# Patient Record
Sex: FEMALE | Race: BLACK OR AFRICAN AMERICAN | Employment: UNEMPLOYED | URBAN - METROPOLITAN AREA
[De-identification: names, ages, dates, MRNs, and addresses within clinical notes are randomized per-mention and may not be internally consistent; named-entity substitution may affect disease eponyms.]

---

## 2018-08-01 PROBLEM — O09.91 HIGH-RISK PREGNANCY, FIRST TRIMESTER: Status: ACTIVE | Noted: 2018-08-01

## 2018-08-01 PROBLEM — Z87.51 HISTORY OF PRETERM DELIVERY: Status: ACTIVE | Noted: 2018-08-01

## 2018-08-01 PROBLEM — Z98.891 HISTORY OF CESAREAN SECTION: Status: ACTIVE | Noted: 2018-08-01

## 2018-08-01 PROBLEM — D57.3 SICKLE CELL TRAIT (HCC): Status: ACTIVE | Noted: 2018-08-01

## 2018-08-30 PROBLEM — D57.1: Status: ACTIVE | Noted: 2018-08-01

## 2018-08-30 PROBLEM — O99.211 OBESITY AFFECTING PREGNANCY IN FIRST TRIMESTER: Status: ACTIVE | Noted: 2018-08-30

## 2018-08-30 PROBLEM — Z36.82 NUCHAL TRANSLUCENCY OF FETUS ON PRENATAL ULTRASOUND: Status: ACTIVE | Noted: 2018-08-30

## 2018-08-30 PROBLEM — O34.219 HISTORY OF CESAREAN SECTION COMPLICATING PREGNANCY: Status: ACTIVE | Noted: 2018-08-01

## 2018-08-30 PROBLEM — O99.011: Status: ACTIVE | Noted: 2018-08-01

## 2018-08-30 PROBLEM — O09.891 HISTORY OF PRETERM DELIVERY, CURRENTLY PREGNANT IN FIRST TRIMESTER: Status: ACTIVE | Noted: 2018-08-01

## 2018-10-17 PROBLEM — O09.892 HISTORY OF PRETERM DELIVERY, CURRENTLY PREGNANT, SECOND TRIMESTER: Status: ACTIVE | Noted: 2018-08-01

## 2018-10-17 PROBLEM — O99.012: Status: ACTIVE | Noted: 2018-08-01

## 2018-10-17 PROBLEM — O09.92 HIGH-RISK PREGNANCY IN SECOND TRIMESTER: Status: ACTIVE | Noted: 2018-08-01

## 2018-10-17 PROBLEM — O99.212 OBESITY AFFECTING PREGNANCY IN SECOND TRIMESTER: Status: ACTIVE | Noted: 2018-08-30

## 2019-01-19 ENCOUNTER — HOSPITAL ENCOUNTER (OUTPATIENT)
Age: 24
Discharge: HOME | End: 2019-01-20
Attending: OBSTETRICS & GYNECOLOGY | Admitting: OBSTETRICS & GYNECOLOGY
Payer: MEDICAID

## 2019-01-19 VITALS
SYSTOLIC BLOOD PRESSURE: 130 MMHG | TEMPERATURE: 98.1 F | DIASTOLIC BLOOD PRESSURE: 66 MMHG | RESPIRATION RATE: 20 BRPM | HEART RATE: 72 BPM

## 2019-01-20 PROBLEM — B34.9 VIRAL ILLNESS: Status: ACTIVE | Noted: 2019-01-20

## 2019-01-20 LAB
FLUAV AG NPH QL IA: NEGATIVE
FLUBV AG NPH QL IA: NEGATIVE
GLUCOSE, GLUUPC: NEGATIVE
KETONES UR-MCNC: NEGATIVE MG/DL
PROT UR QL: NEGATIVE
SPECIMEN SOURCE: NORMAL

## 2019-01-20 PROCEDURE — 96360 HYDRATION IV INFUSION INIT: CPT

## 2019-01-20 PROCEDURE — 99285 EMERGENCY DEPT VISIT HI MDM: CPT | Performed by: OBSTETRICS & GYNECOLOGY

## 2019-01-20 PROCEDURE — 74011000250 HC RX REV CODE- 250: Performed by: OBSTETRICS & GYNECOLOGY

## 2019-01-20 PROCEDURE — 81002 URINALYSIS NONAUTO W/O SCOPE: CPT | Performed by: OBSTETRICS & GYNECOLOGY

## 2019-01-20 PROCEDURE — 59025 FETAL NON-STRESS TEST: CPT | Performed by: OBSTETRICS & GYNECOLOGY

## 2019-01-20 PROCEDURE — 96374 THER/PROPH/DIAG INJ IV PUSH: CPT

## 2019-01-20 PROCEDURE — 87804 INFLUENZA ASSAY W/OPTIC: CPT

## 2019-01-20 PROCEDURE — 59025 FETAL NON-STRESS TEST: CPT

## 2019-01-20 PROCEDURE — 74011250636 HC RX REV CODE- 250/636: Performed by: OBSTETRICS & GYNECOLOGY

## 2019-01-20 PROCEDURE — 96372 THER/PROPH/DIAG INJ SC/IM: CPT | Performed by: OBSTETRICS & GYNECOLOGY

## 2019-01-20 PROCEDURE — 99285 EMERGENCY DEPT VISIT HI MDM: CPT

## 2019-01-20 PROCEDURE — 96374 THER/PROPH/DIAG INJ IV PUSH: CPT | Performed by: OBSTETRICS & GYNECOLOGY

## 2019-01-20 RX ORDER — SODIUM CHLORIDE, SODIUM LACTATE, POTASSIUM CHLORIDE, CALCIUM CHLORIDE 600; 310; 30; 20 MG/100ML; MG/100ML; MG/100ML; MG/100ML
1000 INJECTION, SOLUTION INTRAVENOUS CONTINUOUS
Status: DISCONTINUED | OUTPATIENT
Start: 2019-01-20 | End: 2019-01-20 | Stop reason: HOSPADM

## 2019-01-20 RX ADMIN — SODIUM CHLORIDE, SODIUM LACTATE, POTASSIUM CHLORIDE, AND CALCIUM CHLORIDE 1000 ML/HR: 600; 310; 30; 20 INJECTION, SOLUTION INTRAVENOUS at 01:08

## 2019-01-20 RX ADMIN — PROMETHAZINE HYDROCHLORIDE 12.5 MG: 25 INJECTION INTRAMUSCULAR; INTRAVENOUS at 01:12

## 2019-01-20 NOTE — ED PROVIDER NOTES
Chief Complaint: Nausea, vomiting, diarrhea 
 
 
20 y.o. female  at 32w6d 
weeks gestation who is seen for 1 week h/o nausea, vomiting, diarrhea, muscle aches, and cramping lower abdominal pain. Pt notes good FM. She denies VB, LOF, UTI or PEC symptoms. Pt denies cough, SOB or CP. HISTORY: 
 
Social History Substance and Sexual Activity Sexual Activity Yes  Partners: Male Patient's last menstrual period was 2018. Social History Socioeconomic History  Marital status:  Spouse name: Not on file  Number of children: Not on file  Years of education: Not on file  Highest education level: Not on file Social Needs  Financial resource strain: Not on file  Food insecurity - worry: Not on file  Food insecurity - inability: Not on file  Transportation needs - medical: Not on file  Transportation needs - non-medical: Not on file Occupational History  Not on file Tobacco Use  Smoking status: Never Smoker  Smokeless tobacco: Never Used Substance and Sexual Activity  Alcohol use: No  
 Drug use: No  
 Sexual activity: Yes  
  Partners: Male Other Topics Concern  Not on file Social History Narrative  Not on file Past Surgical History:  
Procedure Laterality Date   DELIVERY ONLY    
 HX CHOLECYSTECTOMY  HX TONSILLECTOMY Past Medical History:  
Diagnosis Date  
 Hx: UTI (urinary tract infection) 2018  Maternal sickle cell anemia complicating pregnancy, first trimester ([facility] [address]   delivery  Sickle cell disease ([facility] [address] Pt carrier of SC trait ROS: 
An 8 point review of symptoms negative except for chief complaint as described above. PHYSICAL EXAM: 
Blood pressure 130/66, pulse 72, temperature 98.1 °F (36.7 °C), resp. rate 20, last menstrual period 2018. Constitutional: The patient appears well, alert, oriented x 3. Cardiovascular: Heart RRR, no murmurs. Respiratory: Lungs clear, no respiratory distress GI: Abdomen soft, nontender, no guarding No fundal tenderness Lower ext: no edema, neg yumiko's, reflexes +2 Psychiatric:Mood/ Affect: appropriate Genitourinary: SVE: long and closed FHT: Category 1 with mod variability and + accels TOCO: rare ctx Abd ultrasound: vtx/ active fetus/FLASH is 10.5cm Vaginal ultrasound: CL - 3.75cm without funneling Flu swab obtained I personally reviewed pt's medical record including relevant labs and ultrasounds Assessment/Plan: 
Probable viral illness. No evidence of PTL. Administer IV fluids and IV phenergan. Will discharge to home after above administered. Pt to f/u with her PObP.

## 2019-01-20 NOTE — PROGRESS NOTES
Presents with multiple complaints nausea, vomiting diarrhea not feeling good, decreased fetal movement but states that she took a benadryl around 2100.

## 2019-01-20 NOTE — DISCHARGE INSTRUCTIONS
Patient Education         Labor: Care Instructions  Your Care Instructions     labor is the start of labor between 21 and 36 weeks of pregnancy. A full-term pregnancy lasts 37 to 42 weeks. In labor, the uterus contracts to open the cervix. This is the first stage of childbirth.  labor can be caused by a problem with the baby, the mother, or both. Often the cause is not known. In some cases, doctors use medicines to try to delay labor until 29 or more weeks of pregnancy. By this time, a baby has grown enough so that problems are not likely. In some cases--such as with a serious infection--it is healthier for the baby to be born early. Your treatment will depend on how far along you are in your pregnancy and on your health and your baby's health. Follow-up care is a key part of your treatment and safety. Be sure to make and go to all appointments, and call your doctor if you are having problems. It's also a good idea to know your test results and keep a list of the medicines you take. How can you care for yourself at home? · If your doctor prescribed medicines, take them exactly as directed. Call your doctor if you think you are having a problem with your medicine. · Rest until your doctor advises you about activity. He or she will tell you if you should stay in bed most of the time. You may need to arrange for  if you have young children. · Do not have sexual intercourse unless your doctor says it is safe. · Use pads, not tampons, if you have vaginal bleeding. · Make sure to drink plenty of fluids. Dehydration can lead to contractions. If you have kidney, heart, or liver disease and have to limit fluids, talk with your doctor before you increase the amount of fluids you drink. · Do not smoke or allow others to smoke around you. If you need help quitting, talk to your doctor about stop-smoking programs and medicines. These can increase your chances of quitting for good.   When should you call for help? Call 911 anytime you think you may need emergency care. For example, call if:    · You passed out (lost consciousness).     · You have severe vaginal bleeding.     · You have severe pain in your belly or pelvis.     · You have had fluid gushing or leaking from your vagina and you know or think the umbilical cord is bulging into your vagina. If this happens, immediately get down on your knees so your rear end (buttocks) is higher than your head. This will decrease the pressure on the cord until help arrives.   [facility] your doctor now or seek immediate medical care if:    · You have signs of preeclampsia, such as:  ? Sudden swelling of your face, hands, or feet. ? New vision problems (such as dimness or blurring). ? A severe headache.     · You have any vaginal bleeding.     · You have belly pain or cramping.     · You have a fever.     · You have had regular contractions (with or without pain) for an hour. This means that you have 6 or more within 1 hour after you change your position and drink fluids.     · You have a sudden release of fluid from the vagina.     · You have low back pain or pelvic pressure that does not go away.     · You notice that your baby has stopped moving or is moving much less than normal.    Watch closely for changes in your health, and be sure to contact your doctor if you have any problems. Where can you learn more? Go to http://anamaria-lalo.info/. Enter Q400 in the search box to learn more about \" Labor: Care Instructions. \"  Current as of: 2018  Content Version: 11.9  © 3724-8629 [employer]. Care instructions adapted under license by [employer] (which disclaims liability or warranty for this information).  If you have questions about a medical condition or this instruction, always ask your healthcare professional. [address] any warranty or liability for your use of this information.

## 2019-01-28 ENCOUNTER — HOSPITAL ENCOUNTER (OUTPATIENT)
Dept: LAB | Age: 24
Discharge: HOME | End: 2019-01-28
Attending: OBSTETRICS & GYNECOLOGY
Payer: MEDICAID

## 2019-01-28 LAB — FIBRONECTIN FETAL VAG QL: NEGATIVE

## 2019-01-28 PROCEDURE — 82731 ASSAY OF FETAL FIBRONECTIN: CPT

## 2019-01-31 PROBLEM — O23.40 GROUP B STREPTOCOCCUS URINARY TRACT INFECTION AFFECTING PREGNANCY: Status: ACTIVE | Noted: 2019-01-31

## 2019-01-31 PROBLEM — B95.1 GROUP B STREPTOCOCCUS URINARY TRACT INFECTION AFFECTING PREGNANCY: Status: ACTIVE | Noted: 2019-01-31

## 2019-02-06 ENCOUNTER — HOSPITAL ENCOUNTER (OUTPATIENT)
Age: 24
Discharge: HOME | End: 2019-02-06
Attending: OBSTETRICS & GYNECOLOGY | Admitting: OBSTETRICS & GYNECOLOGY
Payer: MEDICAID

## 2019-02-06 VITALS — HEIGHT: 63 IN | WEIGHT: 220 LBS | BODY MASS INDEX: 38.98 KG/M2

## 2019-02-06 PROBLEM — O47.9 UTERINE CONTRACTIONS DURING PREGNANCY: Status: ACTIVE | Noted: 2019-02-06

## 2019-02-06 PROCEDURE — 99282 EMERGENCY DEPT VISIT SF MDM: CPT

## 2019-02-07 NOTE — DISCHARGE INSTRUCTIONS
Patient Education   Continue your normal diet and exercise as discussed with your doctor. Keep your next appointment with your doctor. You might notice some brown vaginal discharge due to vaginal check. Notify your doctor for any of the following: contractions every 2-5 minutes lasting for at least an hour that increase in strength, if your water breaks, if baby is not moving as much as you are used to, or any other concerns you may have.  Birth: Care Instructions  Your Care Instructions    Many things can cause a baby to be born early. Some early births are planned, such as with twins or triplets. But in most cases, a birth that happens weeks before the due date is a surprise. Whatever the reason, your doctor and medical team will work hard for your baby's health. If you know you will give birth early, then you, your partner, and your doctor can prepare for a  birth. Your baby may be delivered through a cut, called an incision, in your belly. This surgery is called a  delivery, or a . The surgery will make it hard for you to move around for a while. A childbirth (obstetric) team and a new baby () team will be there for your baby's birth. The  team will bring special equipment with them, including a bed with an overhead heater. The obstetric team will take care of you while the  team takes care of your baby. Your baby may need special care for some time. The doctors and nurses in the hospital nursery or the  intensive care unit (NICU) can help a  baby get stronger. Your baby may not be able to suck from a breast or bottle yet. The hospital staff can show you how to get your milk to your baby. Follow-up care is a key part of your treatment and safety. Be sure to make and go to all appointments, and call your doctor if you are having problems. It's also a good idea to know your test results and keep a list of the medicines you take.   How can you care for yourself at home? Before your baby comes home  · Some early babies stay in the hospital for a few days to weeks. Talk with your doctor about this. · It is normal to worry about your premature baby's health. Talk with your baby's doctor about your baby's care. Ask how your baby is responding to treatment. Good nutrition, hospital and home care, and lots of love will help your baby grow. · Your breast milk will come in 3 or 4 days after the birth. So before the birth you will need to decide if you will breastfeed. If you decide to breastfeed:  ? You may need to pump milk for feedings. Alayna Angel do this until your infant is mature enough to feed by mouth. ? You may want to spend the night with your infant. You'll be able to see if he or she is strong enough to nurse around the clock. · Your baby may sleep most of the time. It may seem like it takes a long time for your baby to respond to you. · Machines may help your baby stay warm, breathe, and eat. Ask the NICU staff to explain how the equipment works. With their help, you can quickly learn about the treatment, your baby's needs, and what you can do for your baby. The more you learn while your baby is in the NICU, the better you will be able to take care of your baby at home. · As your baby grows stronger, you will be able to take on more of the caregiving. You will be able to change diapers and hold, feed, and bathe your baby. · If your baby will need special equipment, you will get written instructions for its use. To take care of yourself  · Follow any instructions your doctor has given you for your own care. This will guide your activities and tell you what to watch for in the next few weeks. · Get as much rest as possible. · Do not have sex unless your doctor says it is okay. · Do not smoke or allow others to smoke around you. If you need help quitting, talk to your doctor about stop-smoking programs and medicines.  These can increase your chances of quitting for good. · Your doctor can refer you to support services, such as support groups. They can help you learn what to expect and how to care for your premature baby. Talking with other parents who are dealing with the same things you are will help you with both the challenges and the joys ahead. ·  birth is very stressful and tiring. It is important that you and your partner take good care of yourselves and each other. Where can you learn more? Go to http://anamaria-lalo.info/. Enter B103 in the search box to learn more about \" Birth: Care Instructions. \"  Current as of: 2018  Content Version: 11.9  © 1484-6954 [employer]. Care instructions adapted under license by [employer] (which disclaims liability or warranty for this information). If you have questions about a medical condition or this instruction, always ask your healthcare professional. [place] [address] any warranty or liability for your use of this information. Bradley Browner Contractions: Care Instructions  Your Care Instructions    Stefano Michael contractions prepare your uterus for labor. Think of them as a \"warm-up\" exercise that your body does. You may begin to feel them between the 28th and 30th weeks of your pregnancy. But they start as early as the 20th week. Cayey Michael contractions usually occur more often during the ninth month. They may go away when you are active and return when you rest. These contractions are like mild contractions of true labor, but they occur less often. (You feel fewer than 8 in an hour.) They don't cause your cervix to open. It may be hard for you to tell the difference between Bradley Browner contractions and true labor, especially in your first pregnancy. Follow-up care is a key part of your treatment and safety.  Be sure to make and go to all appointments, and call your doctor if you are having problems. It's also a good idea to know your test results and keep a list of the medicines you take. How can you care for yourself at home? · Try a warm bath to help relieve muscle tension and reduce pain. · Change positions every 30 minutes. Take breaks if you must sit for a long time. Get up and walk around. · Drink plenty of water, enough so that your urine is light yellow or clear like water. · Taking short walks may help you feel better. Your doctor needs to check any contractions that are getting stronger or closer together. Where can you learn more? Go to http://anamaria-lalo.info/. Enter 321 [id number] 266 in the search box to learn more about \"Kinnear Michael Contractions: Care Instructions. \"  Current as of: September 5, 2018  Content Version: 11.9  © 7919-7285 [employer]. Care instructions adapted under license by [employer] (which disclaims liability or warranty for this information). If you have questions about a medical condition or this instruction, always ask your healthcare professional. [address] any warranty or liability for your use of this information.

## 2019-02-07 NOTE — ED PROVIDER NOTES
Chief Complaint: 
 
 
20 y.o. female at 35w2d 
weeks gestation who is seen for contractions, back pain all day. Denies bleeding or LOF, reports good fetal movement. Pregnancy complicated by previous PTD x 2, one w twins. Prior c/s x 2. HISTORY: 
 
Social History Substance and Sexual Activity Sexual Activity Yes  Partners: Male Patient's last menstrual period was 2018. Social History Socioeconomic History  Marital status:  Spouse name: Not on file  Number of children: Not on file  Years of education: Not on file  Highest education level: Not on file Social Needs  Financial resource strain: Not on file  Food insecurity - worry: Not on file  Food insecurity - inability: Not on file  Transportation needs - medical: Not on file  Transportation needs - non-medical: Not on file Occupational History  Not on file Tobacco Use  Smoking status: Never Smoker  Smokeless tobacco: Never Used Substance and Sexual Activity  Alcohol use: No  
 Drug use: No  
 Sexual activity: Yes  
  Partners: Male Other Topics Concern [address] Service Not Asked  Blood Transfusions Not Asked  Caffeine Concern Not Asked  Occupational Exposure Not Asked Florance Felling Hazards Not Asked  Sleep Concern Not Asked  Stress Concern Not Asked  Weight Concern Not Asked  Special Diet Not Asked  Back Care Not Asked  Exercise Not Asked  Bike Helmet Not Asked  Seat Belt Not Asked  Self-Exams Not Asked Social History Narrative  Not on file Past Surgical History:  
Procedure Laterality Date [facility]  ,   HX CHOLECYSTECTOMY  HX TONSILLECTOMY Past Medical History:  
Diagnosis Date  
 Hx: UTI (urinary tract infection) 2018  Maternal sickle cell anemia complicating pregnancy, first trimester ([facility] [address]   delivery  Sickle cell disease ([facility] [address] Pt carrier of SC trait ROS: 
A 12 point review of symptoms negative except for chief complaint as described above. PHYSICAL EXAM: 
Height 5' 3\" (1.6 m), weight 99.8 kg (220 lb), last menstrual period 2018. Constitutional: The patient appears well, alert, oriented x 3. Cardiovascular: Heart RRR, no murmurs. Respiratory: Lungs clear, no respiratory distress GI: Abdomen soft, nontender, no guarding No fundal tenderness Musculoskeletal: no cva tenderness Upper ext: no edema, reflexes +2 Lower ext: no edema, neg yumiko's, reflexes +2 Skin: no rashes or lesions Psychiatric:Mood/ Affect: appropriate Genitourinary: SVE: cl/th/post 
FHT:130's cat 1 
TOCO: mild contractions q 5-7 min. I personally reviewed pt's medical record including relevant labs and ultrasounds Assessment/Plan:  contractions without cx change. Reassuring fetal status. Stable for home, follow up in office as scheduled. Discussed indications for return.

## 2019-02-07 NOTE — PROGRESS NOTES
Pt given discharge instructions- all questions answered and pt verbalizes understanding of instructions. Pt ambulated off unit in stable condition accompanied by .

## 2019-02-07 NOTE — PROGRESS NOTES
Pt states contractions and back pain since yesterday. Denies SROM or vaginal bleeding. Reports good fetal movement.

## 2019-02-11 PROBLEM — B34.9 VIRAL ILLNESS: Status: RESOLVED | Noted: 2019-01-20 | Resolved: 2019-02-11

## 2019-02-11 PROBLEM — O47.9 UTERINE CONTRACTIONS DURING PREGNANCY: Status: RESOLVED | Noted: 2019-02-06 | Resolved: 2019-02-11

## 2019-02-15 ENCOUNTER — HOSPITAL ENCOUNTER (OUTPATIENT)
Age: 24
LOS: 1 days | Discharge: HOME | End: 2019-02-15
Attending: OBSTETRICS & GYNECOLOGY | Admitting: OBSTETRICS & GYNECOLOGY
Payer: MEDICAID

## 2019-02-15 VITALS
HEART RATE: 73 BPM | TEMPERATURE: 98.7 F | RESPIRATION RATE: 20 BRPM | DIASTOLIC BLOOD PRESSURE: 78 MMHG | SYSTOLIC BLOOD PRESSURE: 128 MMHG

## 2019-02-15 PROBLEM — O47.9 UTERINE CONTRACTIONS DURING PREGNANCY: Status: ACTIVE | Noted: 2019-02-15

## 2019-02-15 LAB
ABO + RH BLD: NORMAL
BLOOD GROUP ANTIBODIES SERPL: NORMAL
ERYTHROCYTE [DISTWIDTH] IN BLOOD BY AUTOMATED COUNT: 15.1 % (ref 11.9–14.6)
HCT VFR BLD AUTO: 37.4 % (ref 35.8–46.3)
HGB BLD-MCNC: 12.1 G/DL (ref 11.7–15.4)
MCH RBC QN AUTO: 26.7 PG (ref 26.1–32.9)
MCHC RBC AUTO-ENTMCNC: 32.4 G/DL (ref 31.4–35)
MCV RBC AUTO: 82.6 FL (ref 79.6–97.8)
NRBC # BLD: 0 K/UL (ref 0–0.2)
PLATELET # BLD AUTO: 209 K/UL (ref 150–450)
PMV BLD AUTO: 10.7 FL (ref 9.4–12.3)
RBC # BLD AUTO: 4.53 M/UL (ref 4.05–5.2)
SPECIMEN EXP DATE BLD: NORMAL
WBC # BLD AUTO: 9.3 K/UL (ref 4.3–11.1)

## 2019-02-15 PROCEDURE — 59025 FETAL NON-STRESS TEST: CPT

## 2019-02-15 PROCEDURE — 85027 COMPLETE CBC AUTOMATED: CPT

## 2019-02-15 PROCEDURE — 86900 BLOOD TYPING SEROLOGIC ABO: CPT

## 2019-02-15 PROCEDURE — 36415 COLL VENOUS BLD VENIPUNCTURE: CPT

## 2019-02-15 PROCEDURE — 74011250636 HC RX REV CODE- 250/636: Performed by: OBSTETRICS & GYNECOLOGY

## 2019-02-15 PROCEDURE — 99285 EMERGENCY DEPT VISIT HI MDM: CPT

## 2019-02-15 RX ORDER — DEXTROSE, SODIUM CHLORIDE, SODIUM LACTATE, POTASSIUM CHLORIDE, AND CALCIUM CHLORIDE 5; .6; .31; .03; .02 G/100ML; G/100ML; G/100ML; G/100ML; G/100ML
999 INJECTION, SOLUTION INTRAVENOUS CONTINUOUS
Status: DISCONTINUED | OUTPATIENT
Start: 2019-02-15 | End: 2019-02-15 | Stop reason: HOSPADM

## 2019-02-15 RX ADMIN — SODIUM CHLORIDE, SODIUM LACTATE, POTASSIUM CHLORIDE, CALCIUM CHLORIDE, AND DEXTROSE MONOHYDRATE 999 ML/HR: 600; 310; 30; 20; 5 INJECTION, SOLUTION INTRAVENOUS at 11:00

## 2019-02-15 NOTE — PROGRESS NOTES
Discharge paper signed and states understanding. IV removed with cath intact. Tolerated procedure well. Pt denies any other questions. Discharge to personal auto via ambulation with  at side.

## 2019-02-15 NOTE — CONSULTS
History & Physical    Name: Jesu Rubi MRN: [id number]  SSN: [id number]    YOB: 1995  Age: 20 y.o. Sex: female      Subjective:     Reason for Admission:  Pregnancy and Contractions    History of Present Illness: Ms. Da Silva  is a 20 y. o.  female with an estimated gestational age of 37w2d with Estimated Date of Delivery: 3/11/19. Patient complains of moderate contractions for 4 days. Pregnancy has been complicated by hx of  delivery. Patient denies nausea and vomiting, vaginal bleeding  and vaginal leaking of fluid . Pt rates contraction pain \"10\" on scale of 1-10.       OB History    Para Term  AB Living   3 2   2   3   SAB TAB Ectopic Molar Multiple Live Births           1 3      # Outcome Date GA Lbr Dewey/2nd Weight Sex Delivery Anes PTL Lv   3 Current            2  14 34w0d  3 lb 12 oz (1.701 kg) F CS-Unspec  Y ROSAMARIA      Complications:  premature rupture of membranes (PPROM) with onset of labor within 24 hours of rupture in third trimester, antepartum      Birth Comments: S   1A  12 34w0d  4 lb (1.814 kg) F CS-Unspec   [place]   1[place]  12 34w0d  4 lb 4 oz (1.928 kg) F CS-Unspec   ROSAMARIA      Birth Comments: [place], [place]        Past Medical History:   Diagnosis Date    Hx: UTI (urinary tract infection) 2018    Maternal sickle cell anemia complicating pregnancy, first trimester ([facility] [address]      delivery     Sickle cell disease ([facility] [address]     Pt carrier of SC trait     Past Surgical History:   Procedure Laterality Date     DELIVERY ONLY  ,     HX CHOLECYSTECTOMY      HX TONSILLECTOMY       Social History     Occupational History    Not on file   Tobacco Use    Smoking status: Never Smoker    Smokeless tobacco: Never Used   Substance and Sexual Activity    Alcohol use: No    Drug use: No    Sexual activity: Yes     Partners: Male      Family History   Problem Relation Age of Onset    No Known Problems Mother     No Known Problems Father     Hypertension Maternal Grandmother     Breast Cancer Paternal Grandmother     Lung Cancer Paternal Grandfather        Allergies   Allergen Reactions    Shrimp Itching    Zithromax [Azithromycin] Hives     Prior to Admission medications    Medication Sig Start Date End Date Taking? Authorizing Provider   TURMERIC PO Take  by mouth. Yes Provider, Historical   [id number] anita-folic-dha (PRENATAL DHA+COMPLETE PRENATAL) [id number] mg-mcg-mg cmpk Take  by mouth. Yes Provider, Historical   progesterone (PROMETRIUM) 200 mg capsule Insert 1 Cap into vagina nightly. 10/26/18   Drew Smyth MD   ginger oil (ANTI-NAUSEA GINGER PO) Take  by mouth. Indications: Chews    Provider, Historical   acetaminophen (TYLENOL) 325 mg tablet Take  by mouth every four (4) hours as needed for Pain. Provider, Historical        Review of Systems:  A comprehensive review of systems was negative except for that written in the History of Present Illness. Objective:     Vitals:    Vitals:    02/15/19 0958   BP: 128/78   Pulse: 73   Resp: 20   Temp: 98.7 °F (37.1 °C)      Temp (24hrs), Av.7 °F (37.1 °C), Min:98.7 °F (37.1 °C), Max:98.7 °F (37.1 °C)    BP  Min: 128/78  Max: 128/78     Physical Exam:  Patient without distress.   Pt very calm during interview   Heart: Regular rate and rhythm or S1S2 present  Lung: clear to auscultation throughout lung fields, no wheezes, no rales, no rhonchi and normal respiratory effort  Abdomen: soft, nontender  Cervical Exam: Closed/Thick/High  Lower Extremities:  - Edema No     Membranes:  Intact  Uterine Activity:  Frequency: Every 3 at first then received IV fluid bolus and spaced out to every 8-10 minutes   Fetal Heart Rate:  Reactive       Lab/Data Review:  Recent Results (from the past 24 hour(s))   CBC W/O DIFF    Collection Time: 02/15/19 10:07 AM   Result Value Ref Range    WBC 9.3 4.3 - 11.1 K/uL RBC 4.53 4.05 - 5.2 M/uL    HGB 12.1 11.7 - 15.4 g/dL    HCT 37.4 35.8 - 46.3 %    MCV 82.6 79.6 - 97.8 FL    MCH 26.7 26.1 - 32.9 PG    MCHC 32.4 31.4 - 35.0 g/dL    RDW 15.1 (H) 11.9 - 14.6 %    PLATELET 583 711 - 914 K/uL    MPV 10.7 9.4 - 12.3 FL    ABSOLUTE NRBC 0.00 0.0 - 0.2 K/uL   TYPE & SCREEN    Collection Time: 02/15/19 10:46 AM   Result Value Ref Range    Crossmatch Expiration 2019     ABO/Rh(D) O POSITIVE     Antibody screen NEG        Assessment and Plan: Active Problems:    Uterine contractions during pregnancy (2/15/2019)       - Previous  Delivery:  pt had reactive nst with extended monitoring and cervix was unchanged from office exam.  Pt was discharged home with office follow up.      Signed By:  Jacquelyn Motta DO     February 15, 2019

## 2019-02-15 NOTE — DISCHARGE INSTRUCTIONS
Pregnancy Precautions: Care Instructions  Your Care Instructions    There is no sure way to prevent labor before your due date ( labor) or to prevent most other pregnancy problems. But there are things you can do to increase your chances of a healthy pregnancy. Go to your appointments, follow your doctor's advice, and take good care of yourself. Eat well, and exercise (if your doctor agrees). And make sure to drink plenty of water. Follow-up care is a key part of your treatment and safety. Be sure to make and go to all appointments, and call your doctor if you are having problems. It's also a good idea to know your test results and keep a list of the medicines you take. How can you care for yourself at home? · Make sure you go to your prenatal appointments. At each visit, your doctor will check your blood pressure. Your doctor will also check to see if you have protein in your urine. High blood pressure and protein in urine are signs of preeclampsia. This condition can be dangerous for you and your baby. · Drink plenty of fluids, enough so that your urine is light yellow or clear like water. Dehydration can cause contractions. If you have kidney, heart, or liver disease and have to limit fluids, talk with your doctor before you increase the amount of fluids you drink. · Tell your doctor right away if you notice any symptoms of an infection, such as:  ? Burning when you urinate. ? A foul-smelling discharge from your vagina. ? Vaginal itching. ? Unexplained fever. ? Unusual pain or soreness in your uterus or lower belly. · Eat a balanced diet. Include plenty of foods that are high in calcium and iron. ? Foods high in calcium include milk, cheese, yogurt, almonds, and broccoli. ? Foods high in iron include red meat, shellfish, poultry, eggs, beans, raisins, whole-grain bread, and leafy green vegetables. · Do not smoke.  If you need help quitting, talk to your doctor about stop-smoking programs and medicines. These can increase your chances of quitting for good. · Do not drink alcohol or use illegal drugs. · Follow your doctor's directions about activity. Your doctor will let you know how much, if any, exercise you can do. · Ask your doctor if you can have sex. If you are at risk for early labor, your doctor may ask you to not have sex. · Take care to prevent falls. During pregnancy, your joints are loose, and your balance is off. Sports such as bicycling, skiing, or in-line skating can increase your risk of falling. And don't ride horses or motorcycles, dive, water ski, scuba dive, or parachute jump while you are pregnant. · Avoid getting very hot. Do not use saunas or hot tubs. Avoid staying out in the sun in hot weather for long periods. Take acetaminophen (Tylenol) to lower a high fever. · Do not take any over-the-counter or herbal medicines or supplements without talking to your doctor or pharmacist first.  When should you call for help? Call 911 anytime you think you may need emergency care. For example, call if:    · You passed out (lost consciousness).     · You have severe vaginal bleeding.     · You have severe pain in your belly or pelvis.     · You have had fluid gushing or leaking from your vagina and you know or think the umbilical cord is bulging into your vagina. If this happens, immediately get down on your knees so your rear end (buttocks) is higher than your head. This will decrease the pressure on the cord until help arrives.   [facility] your doctor now or seek immediate medical care if:    · You have signs of preeclampsia, such as:  ? Sudden swelling of your face, hands, or feet. ? New vision problems (such as dimness or blurring). ? A severe headache.     · You have any vaginal bleeding.     · You have belly pain or cramping.     · You have a fever.     · You have had regular contractions (with or without pain) for an hour.  This means that you have 8 or more within 1 hour or 4 or more in 20 minutes after you change your position and drink fluids.     · You have a sudden release of fluid from your vagina.     · You have low back pain or pelvic pressure that does not go away.     · You notice that your baby has stopped moving or is moving much less than normal.    Watch closely for changes in your health, and be sure to contact your doctor if you have any problems.

## 2019-02-15 NOTE — PROGRESS NOTES
SVE done. 0/50/-2 posterior and ballotable. Cervix is soft. Discussed POC for discharge as per Dr Adrian Ely order. FHR remains reactive. Comfort measures discussed with pt and . Pt mother arrived to bedside and expresses concern for discharge. Pt states questions answered. Calm and agreeable initially, now states, \"Next, I may not even come back\".  calm and agreeable.

## 2019-02-15 NOTE — PROGRESS NOTES
Admit to 437 for extended monitoring. NPO now. Last ate at 0300 a PB&J sandwich. Rates pain 10/10 with UC/pelvic/ incisional scar pain. Pt calm and using cell phone. IV started to left hand per [facility] TREY DAUGHERTY. Blood drawn and sent to lab as ordered. Dr Milly Burnette at bedside to discuss POC. Will observe for fetal distress and labor. Dr Milly Burnette states we will have to see cervical change likely to have repeat  section today. D%LR bolus ing infusing. FHR reactive.

## 2019-03-13 PROBLEM — E66.01 SEVERE OBESITY (HCC): Status: ACTIVE | Noted: 2019-03-13

## 2019-03-21 ENCOUNTER — HOSPITAL ENCOUNTER (EMERGENCY)
Age: 24
Discharge: HOME | End: 2019-03-21
Attending: EMERGENCY MEDICINE
Payer: MEDICAID

## 2019-03-21 ENCOUNTER — APPOINTMENT (OUTPATIENT)
Dept: CT IMAGING | Age: 24
End: 2019-03-21
Attending: EMERGENCY MEDICINE
Payer: MEDICAID

## 2019-03-21 VITALS
BODY MASS INDEX: 39.01 KG/M2 | RESPIRATION RATE: 16 BRPM | HEART RATE: 68 BPM | SYSTOLIC BLOOD PRESSURE: 135 MMHG | DIASTOLIC BLOOD PRESSURE: 70 MMHG | OXYGEN SATURATION: 99 % | HEIGHT: 62 IN | TEMPERATURE: 98.5 F | WEIGHT: 212 LBS

## 2019-03-21 DIAGNOSIS — I88.9 CERVICAL LYMPHADENITIS: ICD-10-CM

## 2019-03-21 DIAGNOSIS — Q18.0 CONGENITAL BRANCHIAL CLEFT CYST: Primary | ICD-10-CM

## 2019-03-21 LAB
ALBUMIN SERPL-MCNC: 3.7 G/DL (ref 3.5–5)
ALBUMIN/GLOB SERPL: 1.1 {RATIO}
ALP SERPL-CCNC: 89 U/L (ref 50–130)
ALT SERPL-CCNC: 31 U/L (ref 12–65)
ANION GAP SERPL CALC-SCNC: 7 MMOL/L
AST SERPL-CCNC: 18 U/L (ref 15–37)
BASOPHILS # BLD: 0.1 K/UL (ref 0–0.2)
BASOPHILS NFR BLD: 1 % (ref 0–2)
BILIRUB SERPL-MCNC: 1 MG/DL (ref 0.2–1.1)
BUN SERPL-MCNC: 7 MG/DL (ref 6–23)
CALCIUM SERPL-MCNC: 9 MG/DL (ref 8.3–10.4)
CHLORIDE SERPL-SCNC: 107 MMOL/L (ref 98–107)
CO2 SERPL-SCNC: 28 MMOL/L (ref 21–32)
CREAT SERPL-MCNC: 0.57 MG/DL (ref 0.6–1)
DIFFERENTIAL METHOD BLD: NORMAL
EOSINOPHIL # BLD: 0.2 K/UL (ref 0–0.8)
EOSINOPHIL NFR BLD: 2 % (ref 0.5–7.8)
ERYTHROCYTE [DISTWIDTH] IN BLOOD BY AUTOMATED COUNT: 14.6 % (ref 11.9–14.6)
GLOBULIN SER CALC-MCNC: 3.5 G/DL (ref 2.3–3.5)
GLUCOSE SERPL-MCNC: 80 MG/DL (ref 65–100)
HCG UR QL: NEGATIVE
HCT VFR BLD AUTO: 41.5 % (ref 35.8–46.3)
HGB BLD-MCNC: 13.4 G/DL (ref 11.7–15.4)
IMM GRANULOCYTES # BLD AUTO: 0 K/UL (ref 0–0.5)
IMM GRANULOCYTES NFR BLD AUTO: 0 % (ref 0–5)
LYMPHOCYTES # BLD: 1.5 K/UL (ref 0.5–4.6)
LYMPHOCYTES NFR BLD: 16 % (ref 13–44)
MCH RBC QN AUTO: 26.7 PG (ref 26.1–32.9)
MCHC RBC AUTO-ENTMCNC: 32.3 G/DL (ref 31.4–35)
MCV RBC AUTO: 82.7 FL (ref 79.6–97.8)
MONOCYTES # BLD: 0.9 K/UL (ref 0.1–1.3)
MONOCYTES NFR BLD: 9 % (ref 4–12)
NEUTS SEG # BLD: 6.6 K/UL (ref 1.7–8.2)
NEUTS SEG NFR BLD: 72 % (ref 43–78)
NRBC # BLD: 0 K/UL (ref 0–0.2)
PLATELET # BLD AUTO: 257 K/UL (ref 150–450)
PMV BLD AUTO: 11.3 FL (ref 9.4–12.3)
POTASSIUM SERPL-SCNC: 3.8 MMOL/L (ref 3.5–5.1)
PROT SERPL-MCNC: 7.2 G/DL
RBC # BLD AUTO: 5.02 M/UL (ref 4.05–5.2)
SODIUM SERPL-SCNC: 142 MMOL/L (ref 136–145)
WBC # BLD AUTO: 9.2 K/UL (ref 4.3–11.1)

## 2019-03-21 PROCEDURE — 74011636320 HC RX REV CODE- 636/320: Performed by: EMERGENCY MEDICINE

## 2019-03-21 PROCEDURE — 85025 COMPLETE CBC W/AUTO DIFF WBC: CPT

## 2019-03-21 PROCEDURE — 96374 THER/PROPH/DIAG INJ IV PUSH: CPT | Performed by: EMERGENCY MEDICINE

## 2019-03-21 PROCEDURE — 99284 EMERGENCY DEPT VISIT MOD MDM: CPT | Performed by: EMERGENCY MEDICINE

## 2019-03-21 PROCEDURE — 74011000258 HC RX REV CODE- 258: Performed by: EMERGENCY MEDICINE

## 2019-03-21 PROCEDURE — 81025 URINE PREGNANCY TEST: CPT

## 2019-03-21 PROCEDURE — 70491 CT SOFT TISSUE NECK W/DYE: CPT

## 2019-03-21 PROCEDURE — 80053 COMPREHEN METABOLIC PANEL: CPT

## 2019-03-21 PROCEDURE — 74011250636 HC RX REV CODE- 250/636: Performed by: EMERGENCY MEDICINE

## 2019-03-21 PROCEDURE — 96375 TX/PRO/DX INJ NEW DRUG ADDON: CPT | Performed by: EMERGENCY MEDICINE

## 2019-03-21 RX ORDER — AMOXICILLIN AND CLAVULANATE POTASSIUM 875; 125 MG/1; MG/1
1 TABLET, FILM COATED ORAL 2 TIMES DAILY
Qty: 20 TAB | Refills: 0 | Status: SHIPPED | OUTPATIENT
Start: 2019-03-21 | End: 2019-06-06

## 2019-03-21 RX ORDER — SODIUM CHLORIDE 0.9 % (FLUSH) 0.9 %
10 SYRINGE (ML) INJECTION
Status: COMPLETED | OUTPATIENT
Start: 2019-03-21 | End: 2019-03-21

## 2019-03-21 RX ORDER — DEXAMETHASONE SODIUM PHOSPHATE 100 MG/10ML
10 INJECTION INTRAMUSCULAR; INTRAVENOUS
Status: DISCONTINUED | OUTPATIENT
Start: 2019-03-21 | End: 2019-03-21

## 2019-03-21 RX ORDER — SODIUM CHLORIDE 0.9 % (FLUSH) 0.9 %
5-40 SYRINGE (ML) INJECTION EVERY 8 HOURS
Status: DISCONTINUED | OUTPATIENT
Start: 2019-03-21 | End: 2019-03-21 | Stop reason: HOSPADM

## 2019-03-21 RX ORDER — KETOROLAC TROMETHAMINE 30 MG/ML
15 INJECTION, SOLUTION INTRAMUSCULAR; INTRAVENOUS
Status: COMPLETED | OUTPATIENT
Start: 2019-03-21 | End: 2019-03-21

## 2019-03-21 RX ORDER — SODIUM CHLORIDE 0.9 % (FLUSH) 0.9 %
5-40 SYRINGE (ML) INJECTION AS NEEDED
Status: DISCONTINUED | OUTPATIENT
Start: 2019-03-21 | End: 2019-03-21 | Stop reason: HOSPADM

## 2019-03-21 RX ORDER — DEXAMETHASONE SODIUM PHOSPHATE 100 MG/10ML
4 INJECTION INTRAMUSCULAR; INTRAVENOUS
Status: COMPLETED | OUTPATIENT
Start: 2019-03-21 | End: 2019-03-21

## 2019-03-21 RX ADMIN — KETOROLAC TROMETHAMINE 15 MG: 30 INJECTION, SOLUTION INTRAMUSCULAR; INTRAVENOUS at 11:39

## 2019-03-21 RX ADMIN — DEXAMETHASONE SODIUM PHOSPHATE 4 MG: 10 INJECTION INTRAMUSCULAR; INTRAVENOUS at 14:33

## 2019-03-21 RX ADMIN — IOPAMIDOL 80 ML: 755 INJECTION, SOLUTION INTRAVENOUS at 13:21

## 2019-03-21 RX ADMIN — Medication 10 ML: at 13:21

## 2019-03-21 RX ADMIN — SODIUM CHLORIDE 100 ML: 900 INJECTION, SOLUTION INTRAVENOUS at 13:21

## 2019-03-21 NOTE — ED NOTES
I have reviewed discharge instructions with the patient. The patient verbalized understanding. Patient left ED via Discharge Method: ambulatory to Home with (insert name of family/friend, self, transport SPOUSE). Opportunity for questions and clarification provided. Patient given 1 scripts. Augmentin To continue your aftercare when you leave the hospital, you may receive an automated call from our care team to check in on how you are doing. This is a free service and part of our promise to provide the best care and service to meet your aftercare needs.  If you have questions, or wish to unsubscribe from this service please call [phone]. Thank you for Choosing our [facility] Emergency Department.

## 2019-03-21 NOTE — DISCHARGE INSTRUCTIONS
Patient Education   Augmentin starting today twice daily for 10 days. Follow-up with Dr. Davida Rico one called with appointment time. Call them Monday if you've not heard from them. Return if any new, worsening or concerning symptoms. Tylenol and ibuprofen for pain. Lymphadenitis: Care Instructions  Your Care Instructions  Lymph nodes are small, bean-shaped glands throughout the body. They help the body fight germs and infections. Lymphadenitis is a swelling of a lymph node. It can be caused by an infection or other condition. The infection is most often in a nearby part of the body. A common example is the lumps on both sides of your neck under the jaw that get tender and bigger when you have a cold or sore throat. Sometimes the lymph node itself may be infected. Usually the swollen lymph nodes go back to normal size without a problem. Treatment, if needed, focuses on treating the cause. For example, a bacterial infection may be treated with antibiotics. This should bring the node back to normal size. An infection caused by a virus often goes away on its own. In rare cases, a badly infected node may need to be drained by your doctor. Follow-up care is a key part of your treatment and safety. Be sure to make and go to all appointments, and call your doctor if you are having problems. It's also a good idea to know your test results and keep a list of the medicines you take. How can you care for yourself at home? · Be safe with medicines. ? If your doctor prescribed antibiotics, take them as directed. Do not stop taking them just because you feel better. You need to take the full course of antibiotics. ? Ask your doctor if you can take an over-the-counter pain medicine, such as acetaminophen (Tylenol), ibuprofen (Advil, Motrin), or naproxen (Aleve). Read and follow all instructions on the label. · If you have pain, try a warm compress. Soak a towel or washcloth in warm water.  Wring it out, and place it on the affected skin. · Do not squeeze, drain, or puncture a painful lump. Doing this can irritate or inflame the lump, push any existing infection deeper into the skin, or cause severe bleeding. When should you call for help? Call your doctor now or seek immediate medical care if:    · Your lymph nodes get bigger.     · The area becomes red and feels more tender.     · You have a fever that does not go away.    Watch closely for changes in your health, and be sure to contact your doctor if:    · You do not get better as expected. Where can you learn more? Go to http://anamaria-lalo.info/. Enter A969 in the search box to learn more about \"Lymphadenitis: Care Instructions. \"  Current as of: July 30, 2018  Content Version: 11.9  © 0774-7244 [employer]. Care instructions adapted under license by [employer] (which disclaims liability or warranty for this information). If you have questions about a medical condition or this instruction, always ask your healthcare professional. [address] any warranty or liability for your use of this information.

## 2019-03-21 NOTE — ED TRIAGE NOTES
Pt states she has had swollen lymph nodes for about a year to her neck. Pt states she was recently pregnant and was referred to ENT after pregnancy. Pt has CT scheduled for next Wednesday but was told to come to ER if swelling became worse.  
 
Brandan Goodson RN

## 2019-04-05 ENCOUNTER — HOSPITAL ENCOUNTER (EMERGENCY)
Age: 24
Discharge: HOME | End: 2019-04-05
Attending: EMERGENCY MEDICINE
Payer: MEDICAID

## 2019-04-05 VITALS
WEIGHT: 212 LBS | OXYGEN SATURATION: 98 % | HEIGHT: 63 IN | SYSTOLIC BLOOD PRESSURE: 130 MMHG | DIASTOLIC BLOOD PRESSURE: 79 MMHG | TEMPERATURE: 98.2 F | BODY MASS INDEX: 37.56 KG/M2 | HEART RATE: 64 BPM | RESPIRATION RATE: 17 BRPM

## 2019-04-05 DIAGNOSIS — T78.40XA ALLERGIC REACTION, INITIAL ENCOUNTER: Primary | ICD-10-CM

## 2019-04-05 PROCEDURE — 99283 EMERGENCY DEPT VISIT LOW MDM: CPT | Performed by: EMERGENCY MEDICINE

## 2019-04-05 PROCEDURE — 74011636637 HC RX REV CODE- 636/637: Performed by: EMERGENCY MEDICINE

## 2019-04-05 RX ORDER — PREDNISONE 20 MG/1
40 TABLET ORAL DAILY
Qty: 8 TAB | Refills: 0 | Status: SHIPPED | OUTPATIENT
Start: 2019-04-05 | End: 2019-04-09

## 2019-04-05 RX ORDER — EPINEPHRINE 0.3 MG/.3ML
0.3 INJECTION SUBCUTANEOUS
Qty: 1 SYRINGE | Refills: 0 | Status: SHIPPED | OUTPATIENT
Start: 2019-04-05 | End: 2019-04-05

## 2019-04-05 RX ORDER — PREDNISONE 20 MG/1
40 TABLET ORAL
Status: COMPLETED | OUTPATIENT
Start: 2019-04-05 | End: 2019-04-05

## 2019-04-05 RX ADMIN — PREDNISONE 40 MG: 20 TABLET ORAL at 23:43

## 2019-04-06 NOTE — ED NOTES
I have reviewed discharge instructions with the patient. The patient verbalized understanding. Patient left ED via Discharge Method: ambulatory to Home with family. Opportunity for questions and clarification provided. Patient given 1 scripts. To continue your aftercare when you leave the hospital, you may receive an automated call from our care team to check in on how you are doing. This is a free service and part of our promise to provide the best care and service to meet your aftercare needs.  If you have questions, or wish to unsubscribe from this service please call [phone]. Thank you for Choosing our [facility] Emergency Department.

## 2019-04-06 NOTE — DISCHARGE INSTRUCTIONS
AS WE DISCUSSED, RETURN IMMEDIATELY WITH ANY SWELLING OF YOUR MOUTH OR THROAT, ANY NAUSEA OR VOMITING, ANY RETURN OR WORSENING RASH, ANY FEVERS OR CHILLS OR ANY FURTHER CONCERNS

## 2019-04-06 NOTE — ED TRIAGE NOTES
Pt states she has been breaking out in hives and thinks she may have come into contact with shrimp. Reports taking 50mg Benadryl cande 1 hour ago. Pt denies any trouble breathing.

## 2019-04-06 NOTE — ED PROVIDER NOTES
Patient is a 21 yo female who presents with concern for allergic reaction. States intermittent rash to bilateral arms and trunk that comes and goes and improves with benadryl for the past 2 weeks. States also swelling of her lip yesterday which resolved. No swelling of tongue or throat, no cough or wheezing, no nausea or vomiting. States symptoms began when she started taking augmentin which she recently completed. Overall patient is VERY well appearing, in NAD. No chest pain or abdominal pain, no further complaints. Allergic Reaction Pertinent negatives include no nausea, no vomiting and no shortness of breath. Past Medical History:  
Diagnosis Date  
 Hx: UTI (urinary tract infection) 2018  Maternal sickle cell anemia complicating pregnancy, first trimester ([address]   delivery  Sickle cell disease ([address] Pt carrier of SC trait Past Surgical History:  
Procedure Laterality Date [address] [facility]  ,   HX CHOLECYSTECTOMY  HX TONSILLECTOMY  HX TUBAL LIGATION  2019 Family History:  
Problem Relation Age of Onset  No Known Problems Mother  No Known Problems Father  Hypertension Maternal Grandmother  Breast Cancer Paternal Grandmother  Lung Cancer Paternal Grandfather Social History Socioeconomic History  Marital status:  Spouse name: Not on file  Number of children: Not on file  Years of education: Not on file  Highest education level: Not on file Occupational History  Not on file Social Needs  Financial resource strain: Not on file  Food insecurity:  
  Worry: Not on file Inability: Not on file  Transportation needs:  
  Medical: Not on file Non-medical: Not on file Tobacco Use  Smoking status: Never Smoker  Smokeless tobacco: Never Used Substance and Sexual Activity  Alcohol use: No  
 Drug use: No  
 Sexual activity: Not Currently Partners: Male Lifestyle  Physical activity:  
  Days per week: Not on file Minutes per session: Not on file  Stress: Not on file Relationships  Social connections:  
  Talks on phone: Not on file Gets together: Not on file Attends Jain service: Not on file Active member of club or organization: Not on file Attends meetings of clubs or organizations: Not on file Relationship status: Not on file  Intimate partner violence:  
  Fear of current or ex partner: Not on file Emotionally abused: Not on file Physically abused: Not on file Forced sexual activity: Not on file Other Topics Concern [address] Service Not Asked  Blood Transfusions Not Asked  Caffeine Concern Not Asked  Occupational Exposure Not Asked [employer] Hazards Not Asked  Sleep Concern Not Asked  Stress Concern Not Asked  Weight Concern Not Asked  Special Diet Not Asked  Back Care Not Asked  Exercise Not Asked  Bike Helmet Not Asked  Seat Belt Not Asked  Self-Exams Not Asked Social History Narrative  Not on file ALLERGIES: Shrimp and Zithromax [azithromycin] Review of Systems Constitutional: Negative for chills and fever. HENT: Negative for rhinorrhea and sore throat. Eyes: Negative for visual disturbance. Respiratory: Negative for cough and shortness of breath. Cardiovascular: Negative for chest pain and leg swelling. Gastrointestinal: Negative for abdominal pain, diarrhea, nausea and vomiting. Genitourinary: Negative for dysuria. Musculoskeletal: Negative for back pain and neck pain. Skin: Positive for rash. Neurological: Negative for weakness and headaches. Psychiatric/Behavioral: The patient is not nervous/anxious. Vitals:  
 04/05/19 2248 BP: 130/79 Pulse: 64 Resp: 17 Temp: 98.2 °F (36.8 °C) SpO2: 98% Weight: 96.2 kg (212 lb) Height: 5' 3\" (1.6 m) Physical Exam  
 Constitutional: She is oriented to person, place, and time. She appears well-developed and well-nourished. HENT:  
Head: Normocephalic. Right Ear: External ear normal.  
Left Ear: External ear normal.  
No swelling of mouth or throat, uvula midline, voice normal.  
Eyes: Pupils are equal, round, and reactive to light. Conjunctivae and EOM are normal.  
Neck: Normal range of motion. Neck supple. No tracheal deviation present. Cardiovascular: Normal rate, regular rhythm, normal heart sounds and intact distal pulses. No murmur heard. Pulmonary/Chest: Effort normal and breath sounds normal. No respiratory distress. Abdominal: Soft. There is no tenderness. Musculoskeletal: Normal range of motion. Neurological: She is alert and oriented to person, place, and time. No cranial nerve deficit. Skin: Rash (mild rash to left forearm at this time. Also shows me picture on phone of rash to abdomen yesterday and bilateral arms a few days prior to that. Rash is urticarial appearing in pictures and on arm today. blanches to palpation, no petechia, no palmar rash) noted. Nursing note and vitals reviewed. MDM Number of Diagnoses or Management Options Allergic reaction, initial encounter: new and requires workup Amount and/or Complexity of Data Reviewed Review and summarize past medical records: yes Risk of Complications, Morbidity, and/or Mortality Presenting problems: low Diagnostic procedures: low Management options: low Patient Progress Patient progress: stable Procedures 19 yo female with allergic reaction: 
 
Patient is VERY well appearing, in NAD, will place on steroids and patient to follow up with PCP in 1-2 days for recheck or return with any worsening rash or immediately with any swelling of mouth or lips or any further concerns. Also given epi pen as she states she has had anaphylactic rash in the past and has no epi pen.

## 2019-04-24 DIAGNOSIS — R22.1 NECK MASS: Primary | ICD-10-CM

## 2019-06-04 DIAGNOSIS — Q18.0 BRANCHIAL CLEFT CYST: Primary | ICD-10-CM

## 2019-06-05 ENCOUNTER — HOSPITAL ENCOUNTER (OUTPATIENT)
Dept: SURGERY | Age: 24
Discharge: HOME | End: 2019-06-05

## 2019-06-06 VITALS — WEIGHT: 212 LBS | BODY MASS INDEX: 37.56 KG/M2 | HEIGHT: 63 IN

## 2019-06-06 NOTE — PERIOP NOTES
Patient verified name and . Order for consent found in EHR and matches case posting; patient verifies procedure. Type 1B surgery, PAT phone assessment complete. Orders received. Labs per surgeon: None. Labs per anesthesia protocol: None. Patient answered medical/surgical history questions at their best of ability. All prior to admission medications documented in [facility]. Patient instructed to take the following medications the day of surgery according to anesthesia guidelines with a small sip of water: None. Hold all vitamins/supplements/herbals 7 days prior to surgery and NSAIDS 5 days prior to surgery. Patient instructed on the following:  Arrive at [address], time of arrival to be called the day before by 1700  NPO after midnight including gum, mints, and ice chips  Responsible adult must drive patient to the hospital, stay during surgery, and patient will need supervision 24 hours after anesthesia  Use anti-bacterial soap in shower the night before surgery and on the morning of surgery  All piercings must be removed prior to arrival.    Leave all valuables (money and jewelry) at home but bring insurance card and ID on       DOS. Do not wear make-up, nail polish, lotions, cologne, perfumes, powders, or oil on skin. Patient teach back successful and patient demonstrates knowledge of instruction.

## 2019-06-11 ENCOUNTER — ANESTHESIA EVENT (OUTPATIENT)
Dept: SURGERY | Age: 24
End: 2019-06-11
Payer: MEDICAID

## 2019-06-11 RX ORDER — FENTANYL CITRATE 50 UG/ML
100 INJECTION, SOLUTION INTRAMUSCULAR; INTRAVENOUS ONCE
Status: CANCELLED | OUTPATIENT
Start: 2019-06-11 | End: 2019-06-11

## 2019-06-12 ENCOUNTER — ANESTHESIA (OUTPATIENT)
Dept: SURGERY | Age: 24
End: 2019-06-12
Payer: MEDICAID

## 2019-06-12 ENCOUNTER — HOSPITAL ENCOUNTER (OUTPATIENT)
Age: 24
Setting detail: OUTPATIENT SURGERY
Discharge: HOME | End: 2019-06-12
Attending: OTOLARYNGOLOGY | Admitting: OTOLARYNGOLOGY
Payer: MEDICAID

## 2019-06-12 VITALS
TEMPERATURE: 97 F | SYSTOLIC BLOOD PRESSURE: 135 MMHG | BODY MASS INDEX: 39.78 KG/M2 | WEIGHT: 224.5 LBS | HEIGHT: 63 IN | RESPIRATION RATE: 14 BRPM | OXYGEN SATURATION: 100 % | DIASTOLIC BLOOD PRESSURE: 68 MMHG | HEART RATE: 101 BPM

## 2019-06-12 DIAGNOSIS — Q18.0 BRANCHIAL CLEFT CYST: ICD-10-CM

## 2019-06-12 LAB — HCG UR QL: NEGATIVE

## 2019-06-12 PROCEDURE — 74011250637 HC RX REV CODE- 250/637: Performed by: ANESTHESIOLOGY

## 2019-06-12 PROCEDURE — 77030032490 HC SLV COMPR SCD KNE COVD -B: Performed by: OTOLARYNGOLOGY

## 2019-06-12 PROCEDURE — 76060000036 HC ANESTHESIA 2.5 TO 3 HR: Performed by: OTOLARYNGOLOGY

## 2019-06-12 PROCEDURE — 88305 TISSUE EXAM BY PATHOLOGIST: CPT

## 2019-06-12 PROCEDURE — 77030002996 HC SUT SLK J&J -A: Performed by: OTOLARYNGOLOGY

## 2019-06-12 PROCEDURE — 77030013567 HC DRN WND RESERV BARD -A: Performed by: OTOLARYNGOLOGY

## 2019-06-12 PROCEDURE — 76010000172 HC OR TIME 2.5 TO 3 HR INTENSV-TIER 1: Performed by: OTOLARYNGOLOGY

## 2019-06-12 PROCEDURE — 74011250636 HC RX REV CODE- 250/636

## 2019-06-12 PROCEDURE — 76210000016 HC OR PH I REC 1 TO 1.5 HR: Performed by: OTOLARYNGOLOGY

## 2019-06-12 PROCEDURE — 77030012623 HC STIM NRV HND MEDT -B: Performed by: OTOLARYNGOLOGY

## 2019-06-12 PROCEDURE — 77030037088 HC TUBE ENDOTRACH ORAL NSL COVD-A: Performed by: ANESTHESIOLOGY

## 2019-06-12 PROCEDURE — 77030031139 HC SUT VCRL2 J&J -A: Performed by: OTOLARYNGOLOGY

## 2019-06-12 PROCEDURE — 77030018836 HC SOL IRR NACL ICUM -A: Performed by: OTOLARYNGOLOGY

## 2019-06-12 PROCEDURE — 74011000250 HC RX REV CODE- 250

## 2019-06-12 PROCEDURE — 77030011267 HC ELECTRD BLD COVD -A: Performed by: OTOLARYNGOLOGY

## 2019-06-12 PROCEDURE — 74011250636 HC RX REV CODE- 250/636: Performed by: ANESTHESIOLOGY

## 2019-06-12 PROCEDURE — 74011000250 HC RX REV CODE- 250: Performed by: OTOLARYNGOLOGY

## 2019-06-12 PROCEDURE — 77030002974 HC SUT PLN J&J -A: Performed by: OTOLARYNGOLOGY

## 2019-06-12 PROCEDURE — 77030016570 HC BLNKT BAIR HGGR 3M -B: Performed by: ANESTHESIOLOGY

## 2019-06-12 PROCEDURE — 77030008467 HC STPLR SKN COVD -B: Performed by: OTOLARYNGOLOGY

## 2019-06-12 PROCEDURE — 81025 URINE PREGNANCY TEST: CPT

## 2019-06-12 PROCEDURE — 77030039425 HC BLD LARYNG TRULITE DISP TELE -A: Performed by: ANESTHESIOLOGY

## 2019-06-12 PROCEDURE — 77030012406 HC DRN WND PENRS BARD -A: Performed by: OTOLARYNGOLOGY

## 2019-06-12 PROCEDURE — 76210000021 HC REC RM PH II 0.5 TO 1 HR: Performed by: OTOLARYNGOLOGY

## 2019-06-12 RX ORDER — ONDANSETRON 2 MG/ML
INJECTION INTRAMUSCULAR; INTRAVENOUS AS NEEDED
Status: DISCONTINUED | OUTPATIENT
Start: 2019-06-12 | End: 2019-06-12 | Stop reason: HOSPADM

## 2019-06-12 RX ORDER — GLYCOPYRROLATE 0.2 MG/ML
INJECTION INTRAMUSCULAR; INTRAVENOUS AS NEEDED
Status: DISCONTINUED | OUTPATIENT
Start: 2019-06-12 | End: 2019-06-12 | Stop reason: HOSPADM

## 2019-06-12 RX ORDER — NALOXONE HYDROCHLORIDE 0.4 MG/ML
0.1 INJECTION, SOLUTION INTRAMUSCULAR; INTRAVENOUS; SUBCUTANEOUS AS NEEDED
Status: DISCONTINUED | OUTPATIENT
Start: 2019-06-12 | End: 2019-06-12 | Stop reason: HOSPADM

## 2019-06-12 RX ORDER — LIDOCAINE HYDROCHLORIDE 10 MG/ML
0.1 INJECTION INFILTRATION; PERINEURAL AS NEEDED
Status: DISCONTINUED | OUTPATIENT
Start: 2019-06-12 | End: 2019-06-12 | Stop reason: HOSPADM

## 2019-06-12 RX ORDER — SODIUM CHLORIDE, SODIUM LACTATE, POTASSIUM CHLORIDE, CALCIUM CHLORIDE 600; 310; 30; 20 MG/100ML; MG/100ML; MG/100ML; MG/100ML
100 INJECTION, SOLUTION INTRAVENOUS CONTINUOUS
Status: DISCONTINUED | OUTPATIENT
Start: 2019-06-12 | End: 2019-06-12 | Stop reason: HOSPADM

## 2019-06-12 RX ORDER — DEXAMETHASONE SODIUM PHOSPHATE 4 MG/ML
INJECTION, SOLUTION INTRA-ARTICULAR; INTRALESIONAL; INTRAMUSCULAR; INTRAVENOUS; SOFT TISSUE AS NEEDED
Status: DISCONTINUED | OUTPATIENT
Start: 2019-06-12 | End: 2019-06-12 | Stop reason: HOSPADM

## 2019-06-12 RX ORDER — MIDAZOLAM HYDROCHLORIDE 1 MG/ML
2 INJECTION, SOLUTION INTRAMUSCULAR; INTRAVENOUS ONCE
Status: COMPLETED | OUTPATIENT
Start: 2019-06-12 | End: 2019-06-12

## 2019-06-12 RX ORDER — HYDROMORPHONE HYDROCHLORIDE 2 MG/ML
0.5 INJECTION, SOLUTION INTRAMUSCULAR; INTRAVENOUS; SUBCUTANEOUS
Status: DISCONTINUED | OUTPATIENT
Start: 2019-06-12 | End: 2019-06-12 | Stop reason: HOSPADM

## 2019-06-12 RX ORDER — MIDAZOLAM HYDROCHLORIDE 1 MG/ML
2 INJECTION, SOLUTION INTRAMUSCULAR; INTRAVENOUS
Status: DISCONTINUED | OUTPATIENT
Start: 2019-06-12 | End: 2019-06-12 | Stop reason: HOSPADM

## 2019-06-12 RX ORDER — SUCCINYLCHOLINE CHLORIDE 20 MG/ML
INJECTION INTRAMUSCULAR; INTRAVENOUS AS NEEDED
Status: DISCONTINUED | OUTPATIENT
Start: 2019-06-12 | End: 2019-06-12 | Stop reason: HOSPADM

## 2019-06-12 RX ORDER — LIDOCAINE HYDROCHLORIDE 20 MG/ML
INJECTION, SOLUTION EPIDURAL; INFILTRATION; INTRACAUDAL; PERINEURAL AS NEEDED
Status: DISCONTINUED | OUTPATIENT
Start: 2019-06-12 | End: 2019-06-12 | Stop reason: HOSPADM

## 2019-06-12 RX ORDER — ONDANSETRON 2 MG/ML
4 INJECTION INTRAMUSCULAR; INTRAVENOUS ONCE
Status: DISCONTINUED | OUTPATIENT
Start: 2019-06-12 | End: 2019-06-12 | Stop reason: HOSPADM

## 2019-06-12 RX ORDER — PROPOFOL 10 MG/ML
INJECTION, EMULSION INTRAVENOUS AS NEEDED
Status: DISCONTINUED | OUTPATIENT
Start: 2019-06-12 | End: 2019-06-12 | Stop reason: HOSPADM

## 2019-06-12 RX ORDER — LIDOCAINE HYDROCHLORIDE AND EPINEPHRINE 10; 10 MG/ML; UG/ML
INJECTION, SOLUTION INFILTRATION; PERINEURAL AS NEEDED
Status: DISCONTINUED | OUTPATIENT
Start: 2019-06-12 | End: 2019-06-12 | Stop reason: HOSPADM

## 2019-06-12 RX ORDER — OXYCODONE HYDROCHLORIDE 5 MG/1
10 TABLET ORAL
Status: COMPLETED | OUTPATIENT
Start: 2019-06-12 | End: 2019-06-12

## 2019-06-12 RX ORDER — OXYCODONE HYDROCHLORIDE 5 MG/1
5 TABLET ORAL
Status: DISCONTINUED | OUTPATIENT
Start: 2019-06-12 | End: 2019-06-12 | Stop reason: HOSPADM

## 2019-06-12 RX ORDER — NEOSTIGMINE METHYLSULFATE 1 MG/ML
INJECTION INTRAVENOUS AS NEEDED
Status: DISCONTINUED | OUTPATIENT
Start: 2019-06-12 | End: 2019-06-12 | Stop reason: HOSPADM

## 2019-06-12 RX ORDER — DIPHENHYDRAMINE HYDROCHLORIDE 50 MG/ML
12.5 INJECTION, SOLUTION INTRAMUSCULAR; INTRAVENOUS
Status: DISCONTINUED | OUTPATIENT
Start: 2019-06-12 | End: 2019-06-12 | Stop reason: HOSPADM

## 2019-06-12 RX ORDER — ROCURONIUM BROMIDE 10 MG/ML
INJECTION, SOLUTION INTRAVENOUS AS NEEDED
Status: DISCONTINUED | OUTPATIENT
Start: 2019-06-12 | End: 2019-06-12 | Stop reason: HOSPADM

## 2019-06-12 RX ORDER — ALBUTEROL SULFATE 0.83 MG/ML
2.5 SOLUTION RESPIRATORY (INHALATION) AS NEEDED
Status: DISCONTINUED | OUTPATIENT
Start: 2019-06-12 | End: 2019-06-12 | Stop reason: HOSPADM

## 2019-06-12 RX ORDER — FENTANYL CITRATE 50 UG/ML
INJECTION, SOLUTION INTRAMUSCULAR; INTRAVENOUS AS NEEDED
Status: DISCONTINUED | OUTPATIENT
Start: 2019-06-12 | End: 2019-06-12 | Stop reason: HOSPADM

## 2019-06-12 RX ADMIN — SUCCINYLCHOLINE CHLORIDE 140 MG: 20 INJECTION INTRAMUSCULAR; INTRAVENOUS at 10:50

## 2019-06-12 RX ADMIN — GLYCOPYRROLATE 0.2 MG: 0.2 INJECTION INTRAMUSCULAR; INTRAVENOUS at 13:13

## 2019-06-12 RX ADMIN — LIDOCAINE HYDROCHLORIDE 100 MG: 20 INJECTION, SOLUTION EPIDURAL; INFILTRATION; INTRACAUDAL; PERINEURAL at 10:50

## 2019-06-12 RX ADMIN — NEOSTIGMINE METHYLSULFATE 1 MG: 1 INJECTION INTRAVENOUS at 13:13

## 2019-06-12 RX ADMIN — ROCURONIUM BROMIDE 5 MG: 10 INJECTION, SOLUTION INTRAVENOUS at 10:50

## 2019-06-12 RX ADMIN — OXYCODONE HYDROCHLORIDE 10 MG: 5 TABLET ORAL at 15:03

## 2019-06-12 RX ADMIN — PROPOFOL 150 MG: 10 INJECTION, EMULSION INTRAVENOUS at 10:50

## 2019-06-12 RX ADMIN — ROCURONIUM BROMIDE 15 MG: 10 INJECTION, SOLUTION INTRAVENOUS at 10:52

## 2019-06-12 RX ADMIN — GLYCOPYRROLATE 0.2 MG: 0.2 INJECTION INTRAMUSCULAR; INTRAVENOUS at 13:12

## 2019-06-12 RX ADMIN — FENTANYL CITRATE 50 MCG: 50 INJECTION, SOLUTION INTRAMUSCULAR; INTRAVENOUS at 11:03

## 2019-06-12 RX ADMIN — SODIUM CHLORIDE, SODIUM LACTATE, POTASSIUM CHLORIDE, AND CALCIUM CHLORIDE 100 ML/HR: 600; 310; 30; 20 INJECTION, SOLUTION INTRAVENOUS at 08:30

## 2019-06-12 RX ADMIN — MIDAZOLAM 2 MG: 1 INJECTION INTRAMUSCULAR; INTRAVENOUS at 10:00

## 2019-06-12 RX ADMIN — FENTANYL CITRATE 25 MCG: 50 INJECTION, SOLUTION INTRAMUSCULAR; INTRAVENOUS at 12:20

## 2019-06-12 RX ADMIN — FENTANYL CITRATE 100 MCG: 50 INJECTION, SOLUTION INTRAMUSCULAR; INTRAVENOUS at 10:50

## 2019-06-12 RX ADMIN — FENTANYL CITRATE 25 MCG: 50 INJECTION, SOLUTION INTRAMUSCULAR; INTRAVENOUS at 12:10

## 2019-06-12 RX ADMIN — DEXAMETHASONE SODIUM PHOSPHATE 8 MG: 4 INJECTION, SOLUTION INTRA-ARTICULAR; INTRALESIONAL; INTRAMUSCULAR; INTRAVENOUS; SOFT TISSUE at 10:58

## 2019-06-12 RX ADMIN — ONDANSETRON 4 MG: 2 INJECTION INTRAMUSCULAR; INTRAVENOUS at 11:06

## 2019-06-12 RX ADMIN — FENTANYL CITRATE 25 MCG: 50 INJECTION, SOLUTION INTRAMUSCULAR; INTRAVENOUS at 13:27

## 2019-06-12 RX ADMIN — NEOSTIGMINE METHYLSULFATE 1 MG: 1 INJECTION INTRAVENOUS at 13:12

## 2019-06-12 RX ADMIN — FENTANYL CITRATE 25 MCG: 50 INJECTION, SOLUTION INTRAMUSCULAR; INTRAVENOUS at 12:46

## 2019-06-12 RX ADMIN — SODIUM CHLORIDE, SODIUM LACTATE, POTASSIUM CHLORIDE, AND CALCIUM CHLORIDE: 600; 310; 30; 20 INJECTION, SOLUTION INTRAVENOUS at 11:21

## 2019-06-12 NOTE — DISCHARGE INSTRUCTIONS
-You may shower and bathe as long as the incision and drain site stay out of water  -Please apply thin layer of Vaseline ointment over the incision 10x daily  -No heavy lifting for 2 wks  -Please empty and record drain output daily

## 2019-06-12 NOTE — H&P
31 yo female w/ L cystic neck mass. First noted lat Feb and it has been intermittently swelling over the past yr. She just had her 4th child- delivered via  and the swelling seemed to get worse during her third trimester. No change in saliva or dry mouth. She has no previous salivary pathology. Worked up w. CT neck which revealed multicystic mass, poss c/w BCC.      Past Medical History:   Diagnosis Date    Branchial cleft cyst     Hx: UTI (urinary tract infection) 2018    Maternal sickle cell anemia complicating pregnancy, first trimester ([facility]      delivery     Sickle cell trait ([facility]      Past Surgical History:   Procedure Laterality Date     DELIVERY ONLY  ,     HX CHOLECYSTECTOMY      HX TONSILLECTOMY      HX TUBAL LIGATION  2019     Social History     Socioeconomic History    Marital status:      Spouse name: Not on file    Number of children: Not on file    Years of education: Not on file    Highest education level: Not on file   Occupational History    Not on file   Social Needs    Financial resource strain: Not on file    Food insecurity:     Worry: Not on file     Inability: Not on file    Transportation needs:     Medical: Not on file     Non-medical: Not on file   Tobacco Use    Smoking status: Never Smoker    Smokeless tobacco: Never Used   Substance and Sexual Activity    Alcohol use: No    Drug use: No    Sexual activity: Not Currently     Partners: Male   Lifestyle    Physical activity:     Days per week: Not on file     Minutes per session: Not on file    Stress: Not on file   Relationships    Social connections:     Talks on phone: Not on file     Gets together: Not on file     Attends [employer] service: Not on file     Active member of club or organization: Not on file     Attends meetings of clubs or organizations: Not on file     Relationship status: Not on file    Intimate partner violence:     Fear of current or ex partner: Not on file     Emotionally abused: Not on file     Physically abused: Not on file     Forced sexual activity: Not on file   Other Topics Concern     Service Not Asked    Blood Transfusions Not Asked    Caffeine Concern Not Asked    Occupational Exposure Not Asked    Hobby Hazards Not Asked    Sleep Concern Not Asked    Stress Concern Not Asked    Weight Concern Not Asked    Special Diet Not Asked    Back Care Not Asked    Exercise Not Asked    Bike Helmet Not Asked   [address] Not Asked    Self-Exams Not Asked   Social History Narrative    Not on file     Family History   Problem Relation Age of Onset    No Known Problems Mother     No Known Problems Father     Hypertension Maternal Grandmother     Breast Cancer Paternal Grandmother     Lung Cancer Paternal Grandfather      Allergies   Allergen Reactions    Augmentin [Amoxicillin-Pot Clavulanate] Anaphylaxis    Shrimp Itching    Zithromax [Azithromycin] Hives     No current facility-administered medications on file prior to encounter. Current Outpatient Medications on File Prior to Encounter   Medication Sig Dispense Refill    [id number] anita-folic-dha (PRENATAL DHA+COMPLETE PRENATAL) -300 mg-mcg-mg cmpk Take  by mouth. EXAM:  Visit Vitals  /75 (BP 1 Location: Left arm, BP Patient Position: Sitting)   Pulse 81   Temp 98.3 °F (36.8 °C)   Resp 16   Ht 5' 3\" (1.6 m)   Wt 224 lb 8 oz (101.8 kg)   SpO2 98%   BMI 39.77 kg/m²     General: NAD, well-appearing  Neuro: No gross neuro deficits. No facial weakness. Eyes: No periorbital edema/ecchymosis. No nystagmus. Skin: No facial erythema, rashes or concerning lesions. Nose: No external deviations or saddling. Intranasally, septum is midline without perforations, nasal mucosa appears healthy with no erythema, mucopurulence, or polyps.   Mouth: Moist mucus membranes, normal tongue/palate mobility, no concerning mucosal lesions, there is irritation along L mandible adjacent to molars. Oropharynx clear with no erythema/exudate, no tonsillar hypertrophy. Ears: Normal appearing auricles, no hematomas. EACs clear with no cerumen impaction, healthy canal skin, TM's intact with no perforations or retraction pockets. No middle ear effusions. Neck: There is 2 cm area of swelling along L upper neck, no overlying skin changes, no fluctuance. No thyromegaly or palpable thyroid nodules. Lymphatics: No palpable cervical LAD. Resp: No audible stridor or wheezing. Extremities: No clubbing or cyanosis.       IMAGING:  I reviewed her recent CT neck from [facility]-     Findings: The paranasal sinuses are well pneumatized and aerated. The mastoid  air cells are clear. The major salivary glands are unremarkable. Corresponding  to the area palpable concern on the left are regions of solid and cystic  components. These are situated anterior to the sternocleidomastoid muscle. This  could represent 1 or 2 separate structures measuring approximately 1.8 x 2.3 x  2.2 cm and 1.8 x 1.5 x 3.1 versus a single lesion measuring nearly 3.7 cm in  maximal dimension. There is mild thickening of the adjacent platysma muscle. There is a mildly prominent left level II lymph node posterior to this process  measuring approximately 1.3 cm. The major vascular structures are unremarkable. The thyroid gland is homogeneous.      IMPRESSION:  Solid and cystic region appearing regions within the left neck  anterior to the sternocleidomastoid muscle and posterior to the angle of the  mandible with thickening of the patella but is no muscle. Given the location,  this this would be most suggestive of an an usual appearing branchial cleft cyst  which may be secondary to superimposed infection. The more solid-appearing  components could represent adjacent reactive lymph nodes. Alternatively, cystic  adenopathy cannot be entirely excluded.     A/P:  Her CT scan revealed a cystic mass in L upper neck w/ superimposed infection. She has done better after her abx/steroids and I did not see any abscess on exam today. 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 I discussed the risks of surgery including bleeding/hematoma, infection, recurrence, hoarseness and damage to surrounding structures and she would like to proceed.     [address]

## 2019-06-12 NOTE — ANESTHESIA POSTPROCEDURE EVALUATION
Procedure(s):  BRANCHIAL CLEFT CYST EXCISION.     general    Anesthesia Post Evaluation      Multimodal analgesia: multimodal analgesia used between 6 hours prior to anesthesia start to PACU discharge  Patient location during evaluation: PACU  Patient participation: complete - patient participated  Level of consciousness: awake and awake and alert  Pain management: adequate  Airway patency: patent  Anesthetic complications: no  Cardiovascular status: acceptable  Respiratory status: acceptable  Hydration status: acceptable  Post anesthesia nausea and vomiting:  controlled      Vitals Value Taken Time   /63 6/12/2019  1:30 PM   Temp 36.1 °C (97 °F) 6/12/2019  1:30 PM   Pulse 103 6/12/2019  1:30 PM   Resp 16 6/12/2019  1:30 PM   SpO2 95 % 6/12/2019  1:30 PM

## 2019-06-12 NOTE — ANESTHESIA PREPROCEDURE EVALUATION
Relevant Problems   No relevant active problems       Anesthetic History               Review of Systems / Medical History  Patient summary reviewed, nursing notes reviewed and pertinent labs reviewed    Pulmonary                   Neuro/Psych              Cardiovascular                  Exercise tolerance: >4 METS  Comments: Sickle cell trait   GI/Hepatic/Renal                Endo/Other             Other Findings              Physical Exam    Airway  Mallampati: II  TM Distance: 4 - 6 cm  Neck ROM: normal range of motion   Mouth opening: Normal     Cardiovascular  Regular rate and rhythm,  S1 and S2 normal,  no murmur, click, rub, or gallop             Dental  No notable dental hx       Pulmonary  Breath sounds clear to auscultation               Abdominal         Other Findings            Anesthetic Plan    ASA: 3  Anesthesia type: general          Induction: Intravenous  Anesthetic plan and risks discussed with: Patient

## 2019-06-12 NOTE — BRIEF OP NOTE
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]      Procedure(s):  BRANCHIAL CLEFT CYST EXCISION    Surgeon(s) and Role:     * Maritza Posey MD - Primary          Surgical Staff:  Circ-1: Roberto Robins RN  Scrub Tech-1: Liset Bhardwaj  Scrub Tech-2: Quynh Bush    Event Time In Time Out   Incision Start 1103    Incision Close 1315      Anesthesia: General     IVF: 1200 cc    Estimated Blood Loss: 15 cc  Specimens:   ID Type Source Tests Collected by Time Destination   1 : Left neck mass Preservative   Maritza Posey MD 6/12/2019 1208 Pathology      Findings: 3.5 cm cystic mass in L level 2    Complications: none    Implants: * No implants in log *
26-Oct-2017

## 2019-06-13 NOTE — OP NOTES
[place]  OPERATIVE REPORT    Name:  Aide Macias  MR#:  [id number]  :  1995  ACCOUNT #:  [de-identified]  DATE OF SERVICE:  2019    PREOPERATIVE DIAGNOSIS:  Left branchial cleft cyst.    POSTOPERATIVE DIAGNOSIS:  Left branchial cleft cyst.    PROCEDURE PERFORMED:  Excision of left branchial cleft cyst.    SURGEON:  Dorian Esquivel. Gisell Shankar MD    ANESTHESIA:  General endotracheal.    ANESTHESIOLOGIST:  Jerod Nichole MD    COMPLICATIONS:  None. SPECIMENS REMOVED:  Left neck mass - permanent. ESTIMATED BLOOD LOSS:  15 mL. OPERATIVE FINDINGS:  1. There was a 3.5 cm cystic mass just overlying the internal jugular vein within left level II, most consistent with a second branchial cleft cyst.  The mass was removed en bloc. 2.  There was significant inflammatory reaction and scarring around the mass consistent with recent infection. IV FLUID:  1200 mL crystalloid. DRAINS:  KOBI x 1. DISPOSITION:  PACU, then home. CONDITION:  Stable. BRIEF HISTORY:  The patient is a 64-year-old female, who presented to my office with left neck swelling. This occurred earlier this spring and she was worked up with a CT scan, which revealed a multicystic mass within the left upper neck consistent with either cystic lymphadenopathy or possibly a branchial cleft cyst.  She was treated with antibiotics and the swelling decreased, but the mass remained. Therefore, the decision was made to take her to the operating room for excision of this left neck mass. DESCRIPTION OF PROCEDURE:  The patient was brought back to the operating room and placed on the table in a supine position. General endotracheal anesthesia was inducted without any complications. Once the patient was adequately sedated, a total of 9 mL of 1% lidocaine with 1:100,000 epinephrine was injected along the planned incision line. She was then sterilely prepped and draped in usual fashion.     I began by designing a 5 cm incision along a natural neck skin crease approximately 3 fingerbreadths below the angle of the mandible. I incised the skin and dermis with a 15 blade and dissected through some subcutaneous fat and platysma muscle using Bovie electrocautery. Next, superiorly and inferiorly based subplatysmal skin flaps were raised for improved exposure. I dissected down onto the sternocleidomastoid muscle. The external jugular vein was ligated and divided. I began skeletonizing along the anterior border of the sternocleidomastoid muscle. I dissected down onto some inflamed lymph nodes just overlying the internal jugular vein. I identified the left internal jugular vein just inferior to this mass, where there was less overall tissue inflammation. Anteriorly, I identified the facial vein, which was ligated and divided. I then dissected along the inferior border of the submandibular gland. I continued dissection just deep and slightly anterior to the sternocleidomastoid muscle until I identified a cystic mass, which was surrounded by significant inflammation and scarring. I used careful blunt dissection to carefully dissect out this mass, which was overlying the internal jugular vein. Several small blood vessels were ligated and divided. I was able to use careful blunt dissection to dissect out the mass, which was removed en bloc and passed off for permanent pathology labeled left neck mass. I irrigated out the wound bed and ensured adequate hemostasis using bipolar electrocautery. Anesthesia performed a Valsalva maneuver and no further bleeding was noted. Once hemostasis was ensured, a 10-Chinese round KOBI drain was placed through a separate stab incision approximately 2 cm below the incision line. This was secured in place using a 2-0 silk drain suture.   The incision was then closed in layers using 3-0 undyed Vicryl deep sutures to reapproximate the platysma and the dermis followed by 6-0 fast-absorbing gut in a running locking fashion for the cutaneous layer. This concluded the surgical portion of the procedure. The patient was then awakened from anesthesia, extubated, and taken to PACU in stable condition afterwards.         Corry Mtz MD      [id number]  D:  06/12/2019 13:31  T:  06/12/2019 13:37  JOB #:  [id number]

## 2019-09-23 PROBLEM — O47.9 UTERINE CONTRACTIONS DURING PREGNANCY: Status: RESOLVED | Noted: 2019-02-15 | Resolved: 2019-09-23

## 2019-09-23 PROBLEM — O09.92 HIGH-RISK PREGNANCY IN SECOND TRIMESTER: Status: RESOLVED | Noted: 2018-08-01 | Resolved: 2019-09-23

## 2019-09-23 PROBLEM — D57.1: Status: RESOLVED | Noted: 2018-08-01 | Resolved: 2019-09-23

## 2019-09-23 PROBLEM — O09.892 HISTORY OF PRETERM DELIVERY, CURRENTLY PREGNANT, SECOND TRIMESTER: Status: RESOLVED | Noted: 2018-08-01 | Resolved: 2019-09-23

## 2019-09-23 PROBLEM — O23.40 GROUP B STREPTOCOCCUS URINARY TRACT INFECTION AFFECTING PREGNANCY: Status: RESOLVED | Noted: 2019-01-31 | Resolved: 2019-09-23

## 2019-09-23 PROBLEM — N93.9 ABNORMAL UTERINE BLEEDING (AUB): Status: ACTIVE | Noted: 2019-09-23

## 2019-09-23 PROBLEM — O99.012: Status: RESOLVED | Noted: 2018-08-01 | Resolved: 2019-09-23

## 2019-09-23 PROBLEM — B95.1 GROUP B STREPTOCOCCUS URINARY TRACT INFECTION AFFECTING PREGNANCY: Status: RESOLVED | Noted: 2019-01-31 | Resolved: 2019-09-23

## 2019-09-23 PROBLEM — O99.212 OBESITY AFFECTING PREGNANCY IN SECOND TRIMESTER: Status: RESOLVED | Noted: 2018-08-30 | Resolved: 2019-09-23

## 2019-09-23 PROBLEM — O34.219 HISTORY OF CESAREAN SECTION COMPLICATING PREGNANCY: Status: RESOLVED | Noted: 2018-08-01 | Resolved: 2019-09-23

## 2022-03-18 PROBLEM — N93.9 ABNORMAL UTERINE BLEEDING (AUB): Status: ACTIVE | Noted: 2019-09-23

## 2022-03-19 PROBLEM — E66.01 SEVERE OBESITY (HCC): Status: ACTIVE | Noted: 2019-03-13

## 2023-01-03 ENCOUNTER — OFFICE VISIT (OUTPATIENT)
Dept: OBGYN CLINIC | Age: 28
End: 2023-01-03
Payer: COMMERCIAL

## 2023-01-03 VITALS
BODY MASS INDEX: 41.48 KG/M2 | HEIGHT: 62 IN | DIASTOLIC BLOOD PRESSURE: 74 MMHG | WEIGHT: 225.4 LBS | SYSTOLIC BLOOD PRESSURE: 122 MMHG

## 2023-01-03 DIAGNOSIS — Z01.419 WELL WOMAN EXAM: Primary | ICD-10-CM

## 2023-01-03 DIAGNOSIS — Z13.89 SCREENING FOR GENITOURINARY CONDITION: ICD-10-CM

## 2023-01-03 DIAGNOSIS — Z12.4 SCREENING FOR CERVICAL CANCER: ICD-10-CM

## 2023-01-03 DIAGNOSIS — Z11.51 SCREENING FOR HPV (HUMAN PAPILLOMAVIRUS): ICD-10-CM

## 2023-01-03 LAB
BILIRUBIN, URINE, POC: NEGATIVE
BLOOD URINE, POC: NORMAL
GLUCOSE URINE, POC: NEGATIVE
KETONES, URINE, POC: NEGATIVE
LEUKOCYTE ESTERASE, URINE, POC: NEGATIVE
NITRITE, URINE, POC: NEGATIVE
PH, URINE, POC: 7 (ref 4.6–8)
PROTEIN,URINE, POC: NEGATIVE
SPECIFIC GRAVITY, URINE, POC: 1.02 (ref 1–1.03)
URINALYSIS CLARITY, POC: CLEAR
URINALYSIS COLOR, POC: YELLOW
UROBILINOGEN, POC: NORMAL

## 2023-01-03 PROCEDURE — 81003 URINALYSIS AUTO W/O SCOPE: CPT | Performed by: NURSE PRACTITIONER

## 2023-01-03 PROCEDURE — 99385 PREV VISIT NEW AGE 18-39: CPT | Performed by: NURSE PRACTITIONER

## 2023-01-03 RX ORDER — ERGOCALCIFEROL 1.25 MG/1
CAPSULE ORAL
COMMUNITY
Start: 2022-12-26

## 2023-01-03 RX ORDER — FERROUS SULFATE 325(65) MG
TABLET ORAL
COMMUNITY
Start: 2022-12-29

## 2023-01-03 NOTE — PROGRESS NOTES
Patient presents today for a routine gynecological examination with no complaints. Pt states having heavy periods that last 7-8 days. Will have to change a pad/tampon every 3-4 hours uses the super pads and tampons. She was evaluated for this in 2019 and was found to have normal anatomy on US. She notes her periods became heavy after her BTL. Had labs done by PCP, hgb and TFTs wnl. VitD was low and was started on vitd supplementation  BTL for BC.  + fam hx fibroids    OB History          3    Para        Term   1       2    AB        Living   4         SAB        IAB        Ectopic        Molar        Multiple        Live Births                      GYN History   Last Pap Smear: 18  Pap: negative  HPV: was not met  CT/NG: negative        Patient's last menstrual period was 2022 (exact date). Cycle Length 28   Lasting 8  positive dysmenorrhea;   negative postcoital bleeding    Past Medical History:  Past Medical History:   Diagnosis Date    Branchial cleft cyst     Hx: UTI (urinary tract infection) 2018     delivery     Sickle cell trait [facility])        Past Surgical History:  Past Surgical History:   Procedure Laterality Date     DELIVERY ONLY  , , 2019    CHOLECYSTECTOMY      HEENT Left 2019    excision of L neck branchial cleft cyst- [place]    TONSILLECTOMY      TUBAL LIGATION  2019       Allergies: Allergies   Allergen Reactions    Amoxicillin-Pot Clavulanate Anaphylaxis    Azithromycin Hives    Shrimp Extract Allergy Skin Test Itching       Medication History:  Current Outpatient Medications   Medication Sig Dispense Refill    vitamin D (ERGOCALCIFEROL) 1.25 MG (74230 UT) CAPS capsule       ferrous sulfate (IRON 325) 325 (65 Fe) MG tablet        No current facility-administered medications for this visit.        Social History:  Social History     Socioeconomic History    Marital status:      Spouse name: Not on file    Number of children: Not on file    Years of education: Not on file    Highest education level: Not on file   Occupational History    Not on file   Tobacco Use    Smoking status: Never    Smokeless tobacco: Never   Vaping Use    Vaping Use: Never used   Substance and Sexual Activity    Alcohol use: No    Drug use: No    Sexual activity: Yes     Partners: Male     Birth control/protection: Surgical     Comment: Tubal   Other Topics Concern    Not on file   Social History Narrative    Not on file     Social Determinants of Health     Financial Resource Strain: Not on file   Food Insecurity: Not on file   Transportation Needs: Not on file   Physical Activity: Not on file   Stress: Not on file   Social Connections: Not on file   Intimate Partner Violence: Not on file   Housing Stability: Not on file       Family History:  Family History   Problem Relation Age of Onset    Lung Cancer Paternal Grandfather     Breast Cancer Paternal Grandmother     Sickle Cell Trait Maternal Grandfather     Sickle Cell Trait Mother     No Known Problems Father     Hypertension Maternal Grandmother        Review of Systems - General ROS: negative except for that discussed in HPI      ROS:  Feeling well. No dyspnea or chest pain on exertion. No abdominal pain, change in bowel habits, black or bloody stools. No urinary tract symptoms. No neurological complaints. Objective:   /74   Ht 5' 2\" (1.575 m)   Wt 225 lb 6.4 oz (102.2 kg)   LMP 12/22/2022 (Exact Date)   BMI 41.23 kg/m²   The patient appears well, alert, oriented x 3, in no distress. ENT normal.  Neck supple. No adenopathy or thyromegaly. Lungs:  clear, good air entry, no wheezes, rhonchi or rales. Heart:  S1 and S2 normal, no murmurs, regular rate and rhythm. Abdomen:  soft without tenderness, guarding, mass or organomegaly. Extremities show no edema, normal peripheral pulses. Neurological is normal, no focal findings.     BREAST EXAM: breasts appear normal, no suspicious masses, no skin or nipple changes or axillary nodes, risk and benefit of breast self-exam was discussed scarring noted from HS at axilla and breasts    PELVIC EXAM: VULVA: normal appearing vulva with no masses, tenderness or lesions, VAGINA: normal appearing vagina with normal color and discharge, no lesions, CERVIX: normal appearing cervix without discharge or lesions, UTERUS: uterus is normal size, shape, consistency and nontender, ADNEXA: normal adnexa in size, nontender and no masses    Assessment/Plan:     1. Screening for genitourinary condition    - AMB POC URINALYSIS DIP STICK AUTO W/O MICRO    2. Well woman exam    - PAP IG, CT-NG-TV, rfx Aptima HPV ASCUS ([id number]); Future  - PAP IG, CT-NG-TV, rfx Aptima HPV ASCUS ([id number])    3. Screening for HPV (human papillomavirus)    - PAP IG, CT-NG-TV, rfx Aptima HPV ASCUS ([id number]); Future  - PAP IG, CT-NG-TV, rfx Aptima HPV ASCUS ([id number])    4. Screening for cervical cancer    - PAP IG, CT-NG-TV, rfx Aptima HPV ASCUS ([id number]); Future  - PAP IG, CT-NG-TV, rfx Aptima HPV ASCUS ([id number])     Lengthy disc with pt on options for managing menorrhagia to include ocp vs iud vs lysteda vs ablation vs hyst.   She will rtc for US and visit to eval gyn organs and will discuss management further at that time. pap smear  return annually or prn    Supervising physician is Dr. Nguyễn Devi.

## 2023-01-18 NOTE — PROGRESS NOTES
The patient is a 32 y.o. No obstetric history on file. who is seen for a recheck and US for menorrhagia. She repots since her tubal ligation her periods have become very heavy, Pt states having heavy periods that last 7-8 days. Will have to change a pad/tampon every 3-4 hours uses the super pads and tampons    She reports she will often feel bloating, heaviness in her pelvis with painful periods. Her cycles are overall monthly but will bleed for a week. Heavy bleeding significantly affecting quality of life and she feels fatigue during periods. Recent hgb was wnl, she is taking po iron. She is taking high dose vit d as managed by PCP. She has a BTL for [facility]      Ultrasound findings from today 1/19/23  GYN US performed secondary to menorrhagia, dysmenorrhea    CX appears wnl  Uterus is anteverted, enlarged and heterogenous with the appearance of diffuse adenomyosis   Endo= 8.9 mm, No intracavitary masses visualized     ROV visualized with follicles and appears wnl  LOV visualized with follicles and appears wnl  Bilateral ovarian volumes increased   No adnexal masses or fluid seen. No obstetric history on file. Patient's last menstrual period was 01/12/2023 (exact date). Sexual History:  has sex with males  Contraception:  tubal ligation  Current Outpatient Medications on File Prior to Visit   Medication Sig Dispense Refill    vitamin D (ERGOCALCIFEROL) 1.25 MG (00365 UT) CAPS capsule       ferrous sulfate (IRON 325) 325 (65 Fe) MG tablet        No current facility-administered medications on file prior to visit. ROS:  Feeling well. No dyspnea or chest pain on exertion. No abdominal pain, change in bowel habits, black or bloody stools. No urinary tract symptoms. GYN ROS: she complains of menorrhagia. PHYSICAL EXAM:  Blood pressure 118/64, height 5' 2\" (1.575 m), weight 223 lb 9.6 oz (101.4 kg), last menstrual period 01/12/2023.     The patient appears well, alert, oriented x 3, in no distress.  Exam deferred, talk only    ASSESSMENT:  Encounter Diagnoses   Name Primary?    Menorrhagia with regular cycle Yes    Dysmenorrhea        PLAN:  All questions answered  Diagnosis explained in detail, including differential  Lengthy US review with pt.   Disc uterus with appearance adenomyosis, otherwise unremarkable scan.  Think adenomyosis is contributing to her sx.   We disc disease process in depth and tx options to include medical vs surgical.   She would like to start with trial of ocp but also consider surgical management as well.   She is a good candidate for ablation, so we disc this in depth.     Start loloestrinfe daily   Counseled pt on OCP use. Discussed SE to include BTB, nausea, weight gain and risks to include VTE, stroke, cardiac event. Pt with no contraindications to OCPs.     Pt to rtc for physician talk hyst vs ablation    Continue po iron, check cbc today.      Orders Placed This Encounter   Procedures    AMB POC US, TRANSVAGINAL     Order Specific Question:   Reason for Exam:     Answer:   MENORRHAGIA     Order Specific Question:   Are you Pregnant?     Answer:   No    CBC     Standing Status:   Future     Number of Occurrences:   1     Standing Expiration Date:   1/19/2024         Supervising physician is Dr. Diaz.  30 min chart review, counseling and documentation

## 2023-01-19 ENCOUNTER — OFFICE VISIT (OUTPATIENT)
Dept: OBGYN CLINIC | Age: 28
End: 2023-01-19

## 2023-01-19 VITALS
BODY MASS INDEX: 41.15 KG/M2 | WEIGHT: 223.6 LBS | DIASTOLIC BLOOD PRESSURE: 64 MMHG | HEIGHT: 62 IN | SYSTOLIC BLOOD PRESSURE: 118 MMHG

## 2023-01-19 DIAGNOSIS — N92.0 MENORRHAGIA WITH REGULAR CYCLE: Primary | ICD-10-CM

## 2023-01-19 DIAGNOSIS — N94.6 DYSMENORRHEA: ICD-10-CM

## 2023-01-19 LAB
ERYTHROCYTE [DISTWIDTH] IN BLOOD BY AUTOMATED COUNT: 17 % (ref 11.9–14.6)
HCT VFR BLD AUTO: 38.3 % (ref 35.8–46.3)
HGB BLD-MCNC: 11.8 G/DL (ref 11.7–15.4)
MCH RBC QN AUTO: 24.1 PG (ref 26.1–32.9)
MCHC RBC AUTO-ENTMCNC: 30.8 G/DL (ref 31.4–35)
MCV RBC AUTO: 78.2 FL (ref 82–102)
NRBC # BLD: 0 K/UL (ref 0–0.2)
PLATELET # BLD AUTO: 390 K/UL (ref 150–450)
PMV BLD AUTO: 11.2 FL (ref 9.4–12.3)
RBC # BLD AUTO: 4.9 M/UL (ref 4.05–5.2)
WBC # BLD AUTO: 7.8 K/UL (ref 4.3–11.1)

## 2023-01-30 ENCOUNTER — OFFICE VISIT (OUTPATIENT)
Dept: OBGYN CLINIC | Age: 28
End: 2023-01-30
Payer: COMMERCIAL

## 2023-01-30 VITALS
SYSTOLIC BLOOD PRESSURE: 135 MMHG | WEIGHT: 227 LBS | HEIGHT: 62 IN | BODY MASS INDEX: 41.77 KG/M2 | DIASTOLIC BLOOD PRESSURE: 76 MMHG

## 2023-01-30 DIAGNOSIS — N93.9 ABNORMAL UTERINE BLEEDING (AUB): ICD-10-CM

## 2023-01-30 DIAGNOSIS — R87.610 ASCUS OF CERVIX WITH NEGATIVE HIGH RISK HPV: ICD-10-CM

## 2023-01-30 PROCEDURE — 99214 OFFICE O/P EST MOD 30 MIN: CPT | Performed by: STUDENT IN AN ORGANIZED HEALTH CARE EDUCATION/TRAINING PROGRAM

## 2023-01-30 NOTE — PROGRESS NOTES
Epifanio Lynn (: 1995) is a 32 y.o.  Established patient, here for evaluation of the following chief complaint(s):    AUB f/u/Discuss potential surgery (hyst vs ablation)     HPI:  Pt was seen 23 for AUB:   She repots since her tubal ligation her periods have become very heavy, Pt states having heavy periods that last 7-8 days. Will have to change a pad/tampon every 3-4 hours uses the super pads and tampons. She reports she will often feel bloating, heaviness in her pelvis with painful periods. Her cycles are overall monthly but will bleed for a week. Heavy bleeding significantly affecting quality of life and she feels fatigue during periods. Recent hgb was wnl, she is taking po iron. She is taking high dose vit d as managed by PCP. She has a BTL for [facility]    Ultrasound findings from 23:   GYN US performed secondary to menorrhagia, dysmenorrhea     CX appears wnl  Uterus is anteverted, enlarged and heterogenous with the appearance of diffuse adenomyosis   Endo= 8.9 mm, No intracavitary masses visualized      ROV visualized with follicles and appears wnl  LOV visualized with follicles and appears wnl  Bilateral ovarian volumes increased   No adnexal masses or fluid seen. ASSESSMENT/PLAN:  1. Abnormal uterine bleeding (AUB)  Overview:  23: heavy periods, dysmenorrhea, bloating, fatigue x4yr. Monthly menses, last 8 days. Misses work due to miserable menses. Adenomyosis on US. H/o c-sections x3, and lap tyson. Discussed mgmt of AUB including conservative mgmt with OCP, LNG-IUD, Nexplanon, or invasive options including [facility], endometrial ablation, and hsyterectomy. Pt currently on Loloestrin x1 week, tolerating well. Would like to follow up in 1 month to discuss continuing OCP vs hysterectomy if fails trial of OCP. 2. ASCUS of cervix with negative high risk HPV  Overview:  23: pap smear ASCUS, HPV neg. Recommendation to repeat pap in 3 years.      No follow-ups on file.    SUBJECTIVE/OBJECTIVE:  /76   Ht 5' 2\" (1.575 m)   Wt 227 lb (103 kg)   LMP 2023 (Exact Date)   BMI 41.52 kg/m²      Past Medical History:   Diagnosis Date    Branchial cleft cyst     Hx: UTI (urinary tract infection) 2018     delivery     Sickle cell trait (HCC)       Current Outpatient Medications   Medication Sig Dispense Refill    norethindrone-ethinyl estradiol-Fe (LO LOESTRIN FE) 1 MG-10 MCG / 10 MCG tablet Take 1 tablet by mouth daily Na [place] 278 1 packet 11    vitamin D (ERGOCALCIFEROL) 1.25 MG (01587 UT) CAPS capsule       ferrous sulfate (IRON 325) 325 (65 Fe) MG tablet        No current facility-administered medications for this visit.      Allergies   Allergen Reactions    Amoxicillin-Pot Clavulanate Anaphylaxis    Azithromycin Hives    Shrimp Extract Allergy Skin Test Itching     Social History     Socioeconomic History    Marital status:      Spouse name: Not on file    Number of children: Not on file    Years of education: Not on file    Highest education level: Not on file   Occupational History    Not on file   Tobacco Use    Smoking status: Never    Smokeless tobacco: Never   Vaping Use    Vaping Use: Never used   Substance and Sexual Activity    Alcohol use: No    Drug use: No    Sexual activity: Yes     Partners: Male     Birth control/protection: Surgical     Comment: Tubal   Other Topics Concern    Not on file   Social History Narrative    Not on file     Social Determinants of Health     Financial Resource Strain: Not on file   Food Insecurity: Not on file   Transportation Needs: Not on file   Physical Activity: Not on file   Stress: Not on file   Social Connections: Not on file   Intimate Partner Violence: Not on file   Housing Stability: Not on file      Past Surgical History:   Procedure Laterality Date     DELIVERY ONLY  , ,     CHOLECYSTECTOMY      HEENT Left 2019    excision of L neck branchial cleft cyst- [facility] TONSILLECTOMY      TUBAL LIGATION  2019      OB History    Para Term  AB Living   3   1 2   4   SAB IAB Ectopic Molar Multiple Live Births                        Review of Systems   All other systems reviewed and are negative. Physical Exam  Constitutional:       General: She is not in acute distress. Appearance: Normal appearance. She is not ill-appearing. HENT:      Head: Normocephalic and atraumatic. Nose: Nose normal.   Eyes:      Extraocular Movements: Extraocular movements intact. Conjunctiva/sclera: Conjunctivae normal.   Pulmonary:      Effort: Pulmonary effort is normal. No respiratory distress. Abdominal:      Palpations: Abdomen is soft. Musculoskeletal:         General: Normal range of motion. Cervical back: Normal range of motion. Neurological:      General: No focal deficit present. Mental Status: She is alert and oriented to person, place, and time. Skin:     General: Skin is warm and dry. Psychiatric:         Mood and Affect: Mood normal.         Behavior: Behavior normal.          On this date 2023 I have spent 30 minutes reviewing previous notes, test results and face to face with the patient discussing the diagnosis and importance of compliance with the treatment plan as well as documenting on the day of the visit. An electronic signature was used to authenticate this note.     --Reva Mckeon MD

## 2023-03-22 ENCOUNTER — OFFICE VISIT (OUTPATIENT)
Dept: OBGYN CLINIC | Age: 28
End: 2023-03-22
Payer: COMMERCIAL

## 2023-03-22 ENCOUNTER — TELEPHONE (OUTPATIENT)
Dept: OBGYN CLINIC | Age: 28
End: 2023-03-22

## 2023-03-22 VITALS
HEIGHT: 62 IN | DIASTOLIC BLOOD PRESSURE: 75 MMHG | SYSTOLIC BLOOD PRESSURE: 118 MMHG | WEIGHT: 226.4 LBS | BODY MASS INDEX: 41.66 KG/M2

## 2023-03-22 DIAGNOSIS — N93.9 ABNORMAL UTERINE BLEEDING (AUB): Primary | ICD-10-CM

## 2023-03-22 PROCEDURE — 99213 OFFICE O/P EST LOW 20 MIN: CPT | Performed by: STUDENT IN AN ORGANIZED HEALTH CARE EDUCATION/TRAINING PROGRAM

## 2023-03-22 NOTE — PROGRESS NOTES
Called pt regarding surgery dates.  N/A & L/M
Zaira Mistry  is a 32 y.o. female, [id number]., LMP: 2023, who is seen for follow up on abnormal uterine bleeding. Patient was seen on 23 and planned to follow up in one month to discuss continuing OCP vs hysterectomy if fails trial of OCP. GYN US performed  on 23 secondary to menorrhagia, dysmenorrhea  Cervix appears within normal limits. Uterus is anteverted, enlarged and heterogenous with the appearance of diffuse adenomyosis   Endo= 8.9 mm, No intracavitary masses visualized   ROV visualized with follicles and appears wnl  LOV visualized with follicles and appears wnl  Bilateral ovarian volumes increased   No adnexal masses or fluid seen. HISTORY:  Sexual History:  has sex with males  Contraception:  tubal ligation  Current Outpatient Medications on File Prior to Visit   Medication Sig Dispense Refill    fluconazole (DIFLUCAN) 150 MG tablet Take 1 tablet PO and repeat in 72hrs if sx persist 2 tablet 0    norethindrone-ethinyl estradiol-Fe (LO LOESTRIN FE) 1 MG-10 MCG / 10 MCG tablet Take 1 tablet by mouth daily Na Kopci 278 1 packet 11    vitamin D (ERGOCALCIFEROL) 1.25 MG (79375 UT) CAPS capsule       ferrous sulfate (IRON 325) 325 (65 Fe) MG tablet        No current facility-administered medications on file prior to visit. ROS:  Review of [facility]  has a past medical history of Branchial cleft cyst, Hx: UTI (urinary tract infection),  delivery, and Sickle cell trait ([facility] [address] .    Previous surgeries include  has a past surgical history that includes Tubal ligation (2019); heent (Left, 2019);  delivery only (, , ); Tonsillectomy; and Cholecystectomy. .    Her current meds are   Current Outpatient Medications:     fluconazole (DIFLUCAN) 150 MG tablet, Take 1 tablet PO and repeat in 72hrs if sx persist, Disp: 2 tablet, Rfl: 0    norethindrone-ethinyl estradiol-Fe (LO LOESTRIN FE) 1 MG-10 MCG / 10 MCG tablet, Take 1 tablet by mouth daily
note.    --Mary Euceda MD

## 2023-03-22 NOTE — TELEPHONE ENCOUNTER
----- Message from Sunil Michel MD sent at 3/22/2023  8:43 AM EDT -----  Regarding: Needs EMBx  Needs EMBx prior to hysterectomy, thnx!

## 2023-03-23 ENCOUNTER — PATIENT MESSAGE (OUTPATIENT)
Dept: OBGYN CLINIC | Age: 28
End: 2023-03-23

## 2023-03-23 ENCOUNTER — CLINICAL DOCUMENTATION (OUTPATIENT)
Dept: OBGYN CLINIC | Age: 28
End: 2023-03-23

## 2023-03-24 ENCOUNTER — CLINICAL DOCUMENTATION (OUTPATIENT)
Dept: OBGYN CLINIC | Age: 28
End: 2023-03-24

## 2023-03-27 ENCOUNTER — PREP FOR PROCEDURE (OUTPATIENT)
Dept: OBGYN CLINIC | Age: 28
End: 2023-03-27

## 2023-03-27 ENCOUNTER — CLINICAL DOCUMENTATION (OUTPATIENT)
Dept: OBGYN CLINIC | Age: 28
End: 2023-03-27

## 2023-03-28 ENCOUNTER — TELEPHONE (OUTPATIENT)
Dept: OBGYN CLINIC | Age: 28
End: 2023-03-28

## 2023-03-28 NOTE — TELEPHONE ENCOUNTER
Called pt to discuss EMB. Able to discuss over the phone - explained procedure in detail and need for EMB prior to surgery. Rec 800mg Ibuprofen prior to appt. Pt v/u. No further questions.

## 2023-04-20 ENCOUNTER — TELEPHONE (OUTPATIENT)
Dept: OBGYN CLINIC | Age: 28
End: 2023-04-20

## 2023-04-20 DIAGNOSIS — N93.9 ABNORMAL UTERINE BLEEDING (AUB): Primary | ICD-10-CM

## 2023-04-20 RX ORDER — OXYCODONE HYDROCHLORIDE 5 MG/1
5 TABLET ORAL EVERY 6 HOURS PRN
Qty: 1 TABLET | Refills: 0 | Status: SHIPPED | OUTPATIENT
Start: 2023-04-20 | End: 2023-04-21

## 2023-04-20 NOTE — TELEPHONE ENCOUNTER
Pt would like tablet of oxycodone prior to EMB on 5/9/23. Notified pt that she cannot drive after taking the oxycodone, pt v/u.

## 2023-05-08 NOTE — PROGRESS NOTES
ENDOMETRIAL BIOPSY    Date:  1995    Name:  Nanci Araogn     :  1995    Age:  32 y.o. Reason for procedure:  AUB/Prior to [address], BS      Speculum was inserted. Cervix visualized and cleansed with betadine. Single-tooth tenaculum was applied to anterior lip. Curette was inserted through OS. Uterus sounded to 7 cm. Tissue obtained was normal in amount. Patient tolerated procedure well.         Keren Garcia MD

## 2023-05-09 ENCOUNTER — OFFICE VISIT (OUTPATIENT)
Dept: OBGYN CLINIC | Age: 28
End: 2023-05-09
Payer: COMMERCIAL

## 2023-05-09 VITALS
DIASTOLIC BLOOD PRESSURE: 72 MMHG | SYSTOLIC BLOOD PRESSURE: 126 MMHG | HEIGHT: 62 IN | BODY MASS INDEX: 41.59 KG/M2 | WEIGHT: 226 LBS

## 2023-05-09 DIAGNOSIS — Z32.02 PREGNANCY EXAMINATION OR TEST, NEGATIVE RESULT: ICD-10-CM

## 2023-05-09 DIAGNOSIS — N93.9 ABNORMAL UTERINE BLEEDING (AUB): Primary | ICD-10-CM

## 2023-05-09 LAB
HCG, PREGNANCY, URINE, POC: NEGATIVE
VALID INTERNAL CONTROL, POC: YES

## 2023-05-09 PROCEDURE — 58100 BIOPSY OF UTERUS LINING: CPT | Performed by: STUDENT IN AN ORGANIZED HEALTH CARE EDUCATION/TRAINING PROGRAM

## 2023-05-09 PROCEDURE — 81025 URINE PREGNANCY TEST: CPT | Performed by: STUDENT IN AN ORGANIZED HEALTH CARE EDUCATION/TRAINING PROGRAM

## 2023-05-15 NOTE — PROGRESS NOTES
Enhanced Recovery After GYN Surgery: non-diabetic patients    It is highly recommended you purchase and drink Ensure Complete - one bottle twice daily for five days starting on 06/10/23. Ensure Complete is the preferred formula over other Ensure formulas. It is recommended that you continue drinking this for one month after surgery. The night before surgery 06/15/23, drink 2 bottles of the Ensure Pre-Surgery drink. The morning of surgery 06/16/23, drink one bottle of the Ensure Pre-Surgery drink 2 hours prior to your arrival to the hospital. Drink this over 5-10 minutes. Drink nothing else after drinking the pre-surgical drink the morning of surgery. Bring your patient handbook with you to the hospital.    Things to remember:    1. You will be given clear liquids to drink, advancing diet as tolerated    2. You will be up and moving around with assistance 2-4 hours after surgery. 3. You will be given regularly scheduled pain medications (NSAIDS, Tylenol) with narcotics as needed. 4. You may be able to go home that night if the surgeon okays and you are up and eating and drinking. Otherwise, your discharge will be the following morning around lunch time. 5. Continue drinking Ensure Complete for 5 days after surgery.

## 2023-05-17 ENCOUNTER — HOSPITAL ENCOUNTER (OUTPATIENT)
Dept: SURGERY | Age: 28
Discharge: HOME | End: 2023-05-20
Payer: COMMERCIAL

## 2023-05-17 DIAGNOSIS — Z01.818 PRE-OP TESTING: Primary | ICD-10-CM

## 2023-05-17 LAB — HGB BLD-MCNC: 11.8 G/DL (ref 11.7–15.4)

## 2023-05-17 PROCEDURE — 36415 COLL VENOUS BLD VENIPUNCTURE: CPT

## 2023-05-17 PROCEDURE — 85018 HEMOGLOBIN: CPT

## 2023-05-17 NOTE — NURSE NAVIGATOR
Patient attended [facility]AS/ GYN surgery orientation class today. Detailed instruction book regarding GYN surgery was provided at start of class. Class content included pre-operative instructions for surgery in the week prior to and day before surgery. Packet including Hibiclens and printed instructions on bathing was provided to patient. Detailed and printed diet instruction and presurgical drinks were also given to patient  in accordance with ERAS protocol. Detailed information was given regarding arriving at the hospital and instructions for the patient's day of surgery. Discussed recovery from surgery, hospital stay, pain management, and discharge. Reviewed recovery at home including pelvic rest, driving and activity restrictions, issues requiring call to physician etc. Neha Morseine all questions in detail. Patient voices understanding of all.

## 2023-05-22 ENCOUNTER — PATIENT MESSAGE (OUTPATIENT)
Dept: OBGYN CLINIC | Age: 28
End: 2023-05-22

## 2023-05-22 NOTE — TELEPHONE ENCOUNTER
amount is due before your surgery date. You can pay by phone or in person. Insurance information:  [employer] member # [id number], Co ins 20%, deductible $1000, Met [id number], estimated surgery deposit due $250.      [facility]  Surgery Scheduler  Sherie DUONG  [phone] ext [phone]

## 2023-06-06 NOTE — PROGRESS NOTES
Preop Visit    Ms. Evelyne Joseph presents for a preop visit. She is scheduled for a MAAME MILLER on 6/16/23. Her history, meds, and allergies were reviewed. The procedure was reviewed in detail as well as the risks of bleeding, infection, DVT and potential surgical complications involving the bladder, ureters, colon or intestines. Also the alternatives,  benefits, recovery and follow-up. Prevention of SSI discussed as indicated. All of her questions were answered. Post op scheduled 6/29/23. Exam:  Lungs CTAB  Heart RRR    See the hospital H&P as well as prior chart for details.

## 2023-06-07 ENCOUNTER — PREP FOR PROCEDURE (OUTPATIENT)
Dept: OBGYN CLINIC | Age: 28
End: 2023-06-07

## 2023-06-07 ENCOUNTER — OFFICE VISIT (OUTPATIENT)
Dept: OBGYN CLINIC | Age: 28
End: 2023-06-07

## 2023-06-07 VITALS
BODY MASS INDEX: 41.59 KG/M2 | HEIGHT: 62 IN | SYSTOLIC BLOOD PRESSURE: 118 MMHG | DIASTOLIC BLOOD PRESSURE: 72 MMHG | WEIGHT: 226 LBS

## 2023-06-07 DIAGNOSIS — N93.9 ABNORMAL UTERINE BLEEDING (AUB): ICD-10-CM

## 2023-06-07 DIAGNOSIS — N93.9 ABNORMAL UTERINE BLEEDING (AUB): Primary | ICD-10-CM

## 2023-06-07 DIAGNOSIS — Z01.818 PREOP EXAMINATION: Primary | ICD-10-CM

## 2023-06-07 RX ORDER — IBUPROFEN 800 MG/1
800 TABLET ORAL EVERY 8 HOURS PRN
Qty: 90 TABLET | Refills: 0 | Status: SHIPPED | OUTPATIENT
Start: 2023-06-07

## 2023-06-07 RX ORDER — OXYCODONE HYDROCHLORIDE 5 MG/1
5 TABLET ORAL EVERY 6 HOURS PRN
Qty: 20 TABLET | Refills: 0 | Status: SHIPPED | OUTPATIENT
Start: 2023-06-07 | End: 2023-06-14

## 2023-06-07 RX ORDER — SODIUM CHLORIDE 0.9 % (FLUSH) 0.9 %
5-40 SYRINGE (ML) INJECTION EVERY 12 HOURS SCHEDULED
Status: CANCELLED | OUTPATIENT
Start: 2023-06-07

## 2023-06-07 RX ORDER — SODIUM CHLORIDE 9 MG/ML
INJECTION, SOLUTION INTRAVENOUS PRN
Status: CANCELLED | OUTPATIENT
Start: 2023-06-07

## 2023-06-07 RX ORDER — SODIUM CHLORIDE 0.9 % (FLUSH) 0.9 %
5-40 SYRINGE (ML) INJECTION PRN
Status: CANCELLED | OUTPATIENT
Start: 2023-06-07

## 2023-06-07 RX ORDER — ONDANSETRON 4 MG/1
4 TABLET, FILM COATED ORAL DAILY PRN
Qty: 30 TABLET | Refills: 0 | Status: SHIPPED | OUTPATIENT
Start: 2023-06-07

## 2023-06-07 NOTE — H&P
Gynecology History and Physical    Name: Kelsy Franklin MRN: [id number] SSN: [id number]    YOB: 1995  Age: 25 y.o. Sex: female       Subjective:      Chief complaint:  Heavy periods    Maryam Freedman is a 25 y.o.  female with a history of abnormal uterine bleeding. She is admitted for RA-TL[facility], BS.      OB History          3    Para   3    Term   1       2    AB        Living   4         SAB        IAB        Ectopic        Molar        Multiple   1    Live Births   4              Past Medical History:   Diagnosis Date    Branchial cleft cyst     Hx: UTI (urinary tract infection) 2018     delivery     Sickle cell trait [facility])      Past Surgical History:   Procedure Laterality Date     DELIVERY ONLY  , ,     CHOLECYSTECTOMY      HEENT Left 2019    excision of L neck branchial cleft cyst- [facility]    TONSILLECTOMY      TUBAL LIGATION  2019     Social History     Occupational History    Not on file   Tobacco Use    Smoking status: Never    Smokeless tobacco: Never   Vaping Use    Vaping Use: Never used   Substance and Sexual Activity    Alcohol use: Not Currently     Comment: socially    Drug use: No    Sexual activity: Yes     Partners: Male     Birth control/protection: Surgical     Comment: Tubal     Family History   Problem Relation Age of Onset    Lung Cancer Paternal Grandfather     Breast Cancer Paternal Grandmother     Sickle Cell Trait Maternal Grandfather     Sickle Cell Trait Mother     No Known Problems Father     Hypertension Maternal Grandmother         Allergies   Allergen Reactions    Amoxicillin-Pot Clavulanate Anaphylaxis    Azithromycin Hives    Shrimp Extract Allergy Skin Test Itching     Betadine on skin is ok     Prior to Admission medications    Medication Sig Start Date End Date Taking?  Authorizing Provider   ibuprofen (ADVIL;MOTRIN) 800 MG tablet Take 1 tablet by mouth every 8 hours as needed for Pain 23   Everardo Vincent

## 2023-06-16 ENCOUNTER — HOSPITAL ENCOUNTER (OUTPATIENT)
Age: 28
Setting detail: OBSERVATION
LOS: 1 days | Discharge: HOME | End: 2023-06-17
Attending: STUDENT IN AN ORGANIZED HEALTH CARE EDUCATION/TRAINING PROGRAM | Admitting: HOSPITALIST
Payer: COMMERCIAL

## 2023-06-16 PROBLEM — R06.03 RESPIRATORY DISTRESS: Status: ACTIVE | Noted: 2023-06-16

## 2023-06-16 PROBLEM — Z90.710 S/P HYSTERECTOMY: Status: ACTIVE | Noted: 2023-06-16

## 2023-06-16 LAB
ABO + RH BLD: NORMAL
BLOOD GROUP ANTIBODIES SERPL: NORMAL
ERYTHROCYTE [DISTWIDTH] IN BLOOD BY AUTOMATED COUNT: 16.6 % (ref 11.9–14.6)
HCG UR QL: NEGATIVE
HCT VFR BLD AUTO: 35.3 % (ref 35.8–46.3)
HGB BLD-MCNC: 11.1 G/DL (ref 11.7–15.4)
MCH RBC QN AUTO: 23.8 PG (ref 26.1–32.9)
MCHC RBC AUTO-ENTMCNC: 31.4 G/DL (ref 31.4–35)
MCV RBC AUTO: 75.8 FL (ref 82–102)
NRBC # BLD: 0 K/UL (ref 0–0.2)
PLATELET # BLD AUTO: 359 K/UL (ref 150–450)
PMV BLD AUTO: 10.1 FL (ref 9.4–12.3)
RBC # BLD AUTO: 4.66 M/UL (ref 4.05–5.2)
SPECIMEN EXP DATE BLD: NORMAL
WBC # BLD AUTO: 8.2 K/UL (ref 4.3–11.1)

## 2023-06-16 PROCEDURE — 6370000000 HC RX 637 (ALT 250 FOR IP): Performed by: ANESTHESIOLOGY

## 2023-06-16 PROCEDURE — 6360000002 HC RX W HCPCS: Performed by: ANESTHESIOLOGY

## 2023-06-16 PROCEDURE — 86901 BLOOD TYPING SEROLOGIC RH(D): CPT

## 2023-06-16 PROCEDURE — 3700000001 HC ADD 15 MINUTES (ANESTHESIA): Performed by: STUDENT IN AN ORGANIZED HEALTH CARE EDUCATION/TRAINING PROGRAM

## 2023-06-16 PROCEDURE — 6370000000 HC RX 637 (ALT 250 FOR IP): Performed by: STUDENT IN AN ORGANIZED HEALTH CARE EDUCATION/TRAINING PROGRAM

## 2023-06-16 PROCEDURE — 86900 BLOOD TYPING SEROLOGIC ABO: CPT

## 2023-06-16 PROCEDURE — 2580000003 HC RX 258: Performed by: ANESTHESIOLOGY

## 2023-06-16 PROCEDURE — 88307 TISSUE EXAM BY PATHOLOGIST: CPT

## 2023-06-16 PROCEDURE — 2500000003 HC RX 250 WO HCPCS: Performed by: ANESTHESIOLOGY

## 2023-06-16 PROCEDURE — C1765 ADHESION BARRIER: HCPCS | Performed by: STUDENT IN AN ORGANIZED HEALTH CARE EDUCATION/TRAINING PROGRAM

## 2023-06-16 PROCEDURE — 2580000003 HC RX 258: Performed by: STUDENT IN AN ORGANIZED HEALTH CARE EDUCATION/TRAINING PROGRAM

## 2023-06-16 PROCEDURE — 3600000009 HC SURGERY ROBOT BASE: Performed by: STUDENT IN AN ORGANIZED HEALTH CARE EDUCATION/TRAINING PROGRAM

## 2023-06-16 PROCEDURE — 3600000019 HC SURGERY ROBOT ADDTL 15MIN: Performed by: STUDENT IN AN ORGANIZED HEALTH CARE EDUCATION/TRAINING PROGRAM

## 2023-06-16 PROCEDURE — 7100000001 HC PACU RECOVERY - ADDTL 15 MIN: Performed by: STUDENT IN AN ORGANIZED HEALTH CARE EDUCATION/TRAINING PROGRAM

## 2023-06-16 PROCEDURE — 86850 RBC ANTIBODY SCREEN: CPT

## 2023-06-16 PROCEDURE — 58571 TLH W/T/O 250 G OR LESS: CPT | Performed by: STUDENT IN AN ORGANIZED HEALTH CARE EDUCATION/TRAINING PROGRAM

## 2023-06-16 PROCEDURE — 81025 URINE PREGNANCY TEST: CPT

## 2023-06-16 PROCEDURE — S2900 ROBOTIC SURGICAL SYSTEM: HCPCS | Performed by: STUDENT IN AN ORGANIZED HEALTH CARE EDUCATION/TRAINING PROGRAM

## 2023-06-16 PROCEDURE — 2709999900 HC NON-CHARGEABLE SUPPLY: Performed by: STUDENT IN AN ORGANIZED HEALTH CARE EDUCATION/TRAINING PROGRAM

## 2023-06-16 PROCEDURE — 85027 COMPLETE CBC AUTOMATED: CPT

## 2023-06-16 PROCEDURE — 3700000000 HC ANESTHESIA ATTENDED CARE: Performed by: STUDENT IN AN ORGANIZED HEALTH CARE EDUCATION/TRAINING PROGRAM

## 2023-06-16 PROCEDURE — 2580000003 HC RX 258: Performed by: HOSPITALIST

## 2023-06-16 PROCEDURE — 7100000000 HC PACU RECOVERY - FIRST 15 MIN: Performed by: STUDENT IN AN ORGANIZED HEALTH CARE EDUCATION/TRAINING PROGRAM

## 2023-06-16 PROCEDURE — G0378 HOSPITAL OBSERVATION PER HR: HCPCS

## 2023-06-16 RX ORDER — MIDAZOLAM HYDROCHLORIDE 2 MG/2ML
2 INJECTION, SOLUTION INTRAMUSCULAR; INTRAVENOUS
Status: COMPLETED | OUTPATIENT
Start: 2023-06-16 | End: 2023-06-16

## 2023-06-16 RX ORDER — OXYCODONE HYDROCHLORIDE 5 MG/1
5 TABLET ORAL EVERY 4 HOURS PRN
Status: DISCONTINUED | OUTPATIENT
Start: 2023-06-16 | End: 2023-06-17 | Stop reason: HOSPADM

## 2023-06-16 RX ORDER — ACETAMINOPHEN 500 MG
1000 TABLET ORAL ONCE
Status: COMPLETED | OUTPATIENT
Start: 2023-06-16 | End: 2023-06-16

## 2023-06-16 RX ORDER — SODIUM CHLORIDE 0.9 % (FLUSH) 0.9 %
5-40 SYRINGE (ML) INJECTION EVERY 12 HOURS SCHEDULED
Status: DISCONTINUED | OUTPATIENT
Start: 2023-06-16 | End: 2023-06-16 | Stop reason: HOSPADM

## 2023-06-16 RX ORDER — SODIUM CHLORIDE 0.9 % (FLUSH) 0.9 %
5-40 SYRINGE (ML) INJECTION PRN
Status: DISCONTINUED | OUTPATIENT
Start: 2023-06-16 | End: 2023-06-16 | Stop reason: HOSPADM

## 2023-06-16 RX ORDER — SODIUM CHLORIDE 0.9 % (FLUSH) 0.9 %
5-40 SYRINGE (ML) INJECTION EVERY 12 HOURS SCHEDULED
Status: DISCONTINUED | OUTPATIENT
Start: 2023-06-16 | End: 2023-06-17 | Stop reason: HOSPADM

## 2023-06-16 RX ORDER — SODIUM CHLORIDE 0.9 % (FLUSH) 0.9 %
5-40 SYRINGE (ML) INJECTION PRN
Status: DISCONTINUED | OUTPATIENT
Start: 2023-06-16 | End: 2023-06-17 | Stop reason: HOSPADM

## 2023-06-16 RX ORDER — IBUPROFEN 800 MG/1
800 TABLET ORAL EVERY 8 HOURS PRN
Status: DISCONTINUED | OUTPATIENT
Start: 2023-06-16 | End: 2023-06-17 | Stop reason: HOSPADM

## 2023-06-16 RX ORDER — ACETAMINOPHEN 500 MG
1000 TABLET ORAL EVERY 6 HOURS PRN
COMMUNITY

## 2023-06-16 RX ORDER — NEOSTIGMINE METHYLSULFATE 1 MG/ML
1 INJECTION, SOLUTION INTRAVENOUS ONCE
Status: CANCELLED | OUTPATIENT
Start: 2023-06-16 | End: 2023-06-16

## 2023-06-16 RX ORDER — OXYCODONE HYDROCHLORIDE 5 MG/1
10 TABLET ORAL EVERY 4 HOURS PRN
Status: DISCONTINUED | OUTPATIENT
Start: 2023-06-16 | End: 2023-06-17 | Stop reason: HOSPADM

## 2023-06-16 RX ORDER — SODIUM CHLORIDE 9 MG/ML
INJECTION, SOLUTION INTRAVENOUS CONTINUOUS
Status: DISCONTINUED | OUTPATIENT
Start: 2023-06-16 | End: 2023-06-17 | Stop reason: HOSPADM

## 2023-06-16 RX ORDER — ONDANSETRON 4 MG/1
4 TABLET, ORALLY DISINTEGRATING ORAL EVERY 8 HOURS PRN
Status: DISCONTINUED | OUTPATIENT
Start: 2023-06-16 | End: 2023-06-17 | Stop reason: HOSPADM

## 2023-06-16 RX ORDER — SODIUM CHLORIDE 9 MG/ML
INJECTION, SOLUTION INTRAVENOUS PRN
Status: DISCONTINUED | OUTPATIENT
Start: 2023-06-16 | End: 2023-06-16 | Stop reason: SDUPTHER

## 2023-06-16 RX ORDER — GLYCOPYRROLATE 0.2 MG/ML
0.1 INJECTION INTRAMUSCULAR; INTRAVENOUS ONCE
Status: CANCELLED | OUTPATIENT
Start: 2023-06-16 | End: 2023-06-16

## 2023-06-16 RX ORDER — APREPITANT 40 MG/1
40 CAPSULE ORAL ONCE
Status: COMPLETED | OUTPATIENT
Start: 2023-06-16 | End: 2023-06-16

## 2023-06-16 RX ORDER — PROCHLORPERAZINE EDISYLATE 5 MG/ML
5 INJECTION INTRAMUSCULAR; INTRAVENOUS
Status: DISCONTINUED | OUTPATIENT
Start: 2023-06-16 | End: 2023-06-16 | Stop reason: HOSPADM

## 2023-06-16 RX ORDER — SODIUM CHLORIDE 0.9 % (FLUSH) 0.9 %
5-40 SYRINGE (ML) INJECTION PRN
Status: DISCONTINUED | OUTPATIENT
Start: 2023-06-16 | End: 2023-06-16 | Stop reason: SDUPTHER

## 2023-06-16 RX ORDER — DIPHENHYDRAMINE HYDROCHLORIDE 50 MG/ML
12.5 INJECTION INTRAMUSCULAR; INTRAVENOUS
Status: DISCONTINUED | OUTPATIENT
Start: 2023-06-16 | End: 2023-06-16 | Stop reason: HOSPADM

## 2023-06-16 RX ORDER — OXYCODONE HYDROCHLORIDE 5 MG/1
5 TABLET ORAL
Status: DISCONTINUED | OUTPATIENT
Start: 2023-06-16 | End: 2023-06-16 | Stop reason: HOSPADM

## 2023-06-16 RX ORDER — SODIUM CHLORIDE, SODIUM LACTATE, POTASSIUM CHLORIDE, CALCIUM CHLORIDE 600; 310; 30; 20 MG/100ML; MG/100ML; MG/100ML; MG/100ML
INJECTION, SOLUTION INTRAVENOUS CONTINUOUS
Status: DISCONTINUED | OUTPATIENT
Start: 2023-06-16 | End: 2023-06-16 | Stop reason: HOSPADM

## 2023-06-16 RX ORDER — LIDOCAINE HYDROCHLORIDE 10 MG/ML
1 INJECTION, SOLUTION INFILTRATION; PERINEURAL
Status: COMPLETED | OUTPATIENT
Start: 2023-06-16 | End: 2023-06-16

## 2023-06-16 RX ORDER — POLYETHYLENE GLYCOL 3350 17 G/17G
17 POWDER, FOR SOLUTION ORAL DAILY PRN
Status: DISCONTINUED | OUTPATIENT
Start: 2023-06-16 | End: 2023-06-17 | Stop reason: HOSPADM

## 2023-06-16 RX ORDER — ONDANSETRON 2 MG/ML
4 INJECTION INTRAMUSCULAR; INTRAVENOUS EVERY 6 HOURS PRN
Status: DISCONTINUED | OUTPATIENT
Start: 2023-06-16 | End: 2023-06-17 | Stop reason: HOSPADM

## 2023-06-16 RX ORDER — SODIUM CHLORIDE 0.9 % (FLUSH) 0.9 %
5-40 SYRINGE (ML) INJECTION EVERY 12 HOURS SCHEDULED
Status: DISCONTINUED | OUTPATIENT
Start: 2023-06-16 | End: 2023-06-16 | Stop reason: SDUPTHER

## 2023-06-16 RX ORDER — SODIUM CHLORIDE 9 MG/ML
INJECTION, SOLUTION INTRAVENOUS PRN
Status: DISCONTINUED | OUTPATIENT
Start: 2023-06-16 | End: 2023-06-16 | Stop reason: HOSPADM

## 2023-06-16 RX ADMIN — SODIUM CHLORIDE: 9 INJECTION, SOLUTION INTRAVENOUS at 18:25

## 2023-06-16 RX ADMIN — SODIUM CHLORIDE, PRESERVATIVE FREE 10 ML: 5 INJECTION INTRAVENOUS at 21:20

## 2023-06-16 RX ADMIN — LIDOCAINE HYDROCHLORIDE 1 ML: 10 INJECTION, SOLUTION INFILTRATION; PERINEURAL at 09:41

## 2023-06-16 RX ADMIN — DIPHENHYDRAMINE HYDROCHLORIDE 12.5 MG: 50 INJECTION, SOLUTION INTRAMUSCULAR; INTRAVENOUS at 14:41

## 2023-06-16 RX ADMIN — OXYCODONE HYDROCHLORIDE 5 MG: 5 TABLET ORAL at 18:34

## 2023-06-16 RX ADMIN — SODIUM CHLORIDE, SODIUM LACTATE, POTASSIUM CHLORIDE, AND CALCIUM CHLORIDE: 600; 310; 30; 20 INJECTION, SOLUTION INTRAVENOUS at 09:40

## 2023-06-16 RX ADMIN — ACETAMINOPHEN 1000 MG: 500 TABLET, FILM COATED ORAL at 08:58

## 2023-06-16 RX ADMIN — MIDAZOLAM 2 MG: 1 INJECTION INTRAMUSCULAR; INTRAVENOUS at 10:31

## 2023-06-16 RX ADMIN — APREPITANT 40 MG: 40 CAPSULE ORAL at 08:58

## 2023-06-16 RX ADMIN — HYDROMORPHONE HYDROCHLORIDE 0.5 MG: 1 INJECTION, SOLUTION INTRAMUSCULAR; INTRAVENOUS; SUBCUTANEOUS at 14:26

## 2023-06-16 RX ADMIN — HYDROMORPHONE HYDROCHLORIDE 0.5 MG: 1 INJECTION, SOLUTION INTRAMUSCULAR; INTRAVENOUS; SUBCUTANEOUS at 14:09

## 2023-06-16 ASSESSMENT — PAIN DESCRIPTION - PAIN TYPE
TYPE: ACUTE PAIN;OTHER (COMMENT)
TYPE: SURGICAL PAIN

## 2023-06-16 ASSESSMENT — PAIN DESCRIPTION - DESCRIPTORS
DESCRIPTORS: DISCOMFORT;TENDER
DESCRIPTORS: ACHING

## 2023-06-16 ASSESSMENT — PAIN SCALES - GENERAL
PAINLEVEL_OUTOF10: 8
PAINLEVEL_OUTOF10: 7
PAINLEVEL_OUTOF10: 10
PAINLEVEL_OUTOF10: 5

## 2023-06-16 ASSESSMENT — PAIN DESCRIPTION - ORIENTATION
ORIENTATION: ANTERIOR
ORIENTATION: LEFT
ORIENTATION: LOWER;INNER;MID

## 2023-06-16 ASSESSMENT — PAIN DESCRIPTION - FREQUENCY: FREQUENCY: CONTINUOUS

## 2023-06-16 ASSESSMENT — PAIN - FUNCTIONAL ASSESSMENT: PAIN_FUNCTIONAL_ASSESSMENT: ACTIVITIES ARE NOT PREVENTED

## 2023-06-16 ASSESSMENT — PAIN DESCRIPTION - LOCATION
LOCATION: ARM
LOCATION: ABDOMEN
LOCATION: ABDOMEN;INCISION

## 2023-06-16 ASSESSMENT — PAIN DESCRIPTION - ONSET
ONSET: ON-GOING
ONSET: GRADUAL

## 2023-06-16 NOTE — PROGRESS NOTES
completed initial visit with patient, per request from nurse. Patient had experienced difficulties after surgery that were anxiety producing. Mother was at bedside and supportive. Patient stated that she had been scared, but appears to be coping well at time. Patient reflected especially on her four daughters and family.  provided pastoral presence, prayer and empathetic listening.    Signed by  Yesi Watters M.Div.

## 2023-06-16 NOTE — PERIOP NOTE
Dr. Sandra Field at bedside. MD gives dose of sugammadex 200mg IV due to patient \"guppy breathing\" and unable to take a deep breath. Pt breathing better within seconds. Dr. Carmen Cade and Dr. Judah Dakin at bedside to assess patient.

## 2023-06-16 NOTE — INTERVAL H&P NOTE
Update History & Physical    The patient's History and Physical of June 7, 2023 was reviewed with the patient and I examined the patient. There was no change. The surgical site was confirmed by the patient and me. Plan: The risks, benefits, expected outcome, and alternative to the recommended procedure have been discussed with the patient. Patient understands and wants to proceed with the procedure.      Electronically signed by Carlos Galvez MD on 6/16/2023 at 10:19 AM

## 2023-06-16 NOTE — PROGRESS NOTES
Discharge Criteria:    Pt may be discharged when meets criteria:  1. Tolerate Reg diet  2. Urinates easily with PVR of <150cc  3. Ambulates easily  4. Pain controlled on po meds with pain rated 4/10 or less      Please contact Dr. Kesha Croft with questions. If patient needs to be admitted to the floor, please have her floor nurse call me.       Kesha Croft MD  [facility]

## 2023-06-16 NOTE — OP NOTE
Operative Note      Patient: Jamee Salazar  YOB: 1995  MRN: [id number]    Date of Procedure: 6/16/2023    Pre-Op Diagnosis Codes:     * Abnormal uterine bleeding [N93.9]    Post-Op Diagnosis: Same       Procedure(s): HYSTERECTOMY ABDOMINAL LAPAROSCOPIC BILATERAL SALPINGECTOMY ROBOTIC    Surgeon(s):  Marin Renee MD    Assistant:   * No surgical staff found *    Anesthesia: General    Estimated Blood Loss (mL): Minimal    Complications:   Oxygen desaturation to 60%, resolved with undocking the robot, laying patient flat, and changing out the O2 saturation cable. Specimens:   ID Type Source Tests Collected by Time Destination   A : uterus and bilateral fallopian tubes Tissue Uterus SURGICAL PATHOLOGY Marin Renee MD 6/16/2023 1252        Implants:  * No implants in log *      Drains:   [REMOVED] Urinary Catheter 06/16/23 Ramos (Removed)       Findings:   Kim Evelin. Normal cervix and vagina. Uterine sound length 9cm. Normal liver edge and stomach. Gallbladder surgically absent. Scar tissue adhering omentum to anterior abdominal wall and between bladder and anterior uterus. Taking down scar tissue was complex and added greater than 30 minutes to the procedure time. Enlarged, globular uterus. Normal ovaries bilaterally. Fallopian tubes with evidence of prior tubal ligation. Detailed Description of Procedure: The patient was taken to the operating room where general anesthesia was found to be adequate. Patient was prepped and draped in normal sterile fashion. A Ramos catheter was placed into the urethra. A weighted speculum was placed in the vagina. The anterior lip of the cervix was grasped with a single-tooth tenaculum. The uterus was sounded to 9 cm. The cervix was dilated with Rafael dilators and an [place] uterine manipulator was inserted in the endometrial cavity for means of manipulation. All other instruments were then removed from the vagina.       The patient was flattened so that

## 2023-06-16 NOTE — PROGRESS NOTES
Discussed events of IV infitration and the possible need for more NMB reversal with family.   All questions answered for family, and family understands care plan

## 2023-06-16 NOTE — PERIOP NOTE
TRANSFER - OUT REPORT:    Verbal report given to ELIGIO Parada on Mel Coker  being transferred to [id number] for routine post-op       Report consisted of patient's Situation, Background, Assessment and   Recommendations(SBAR). Information from the following report(s) Nurse Handoff Report and Cardiac Rhythm SR  was reviewed with the receiving nurse. Lines:   Peripheral IV 06/16/23 Left;Posterior Forearm (Active)        Opportunity for questions and clarification was provided.       Patient transported with:  O2 @ room air lpm

## 2023-06-16 NOTE — PROGRESS NOTES
Gynecology Progress Note    Patient doing well post-op day 0 from MAAME [place], complicated by IV infiltration with significant paralytic administration. Pt admitted to ICU overnight for close monitoring for respiratory distress. She is doing well without significant complaints. Pain controlled on current medication. Not yet voided or ambulated. Vitals:  Blood pressure 134/81, pulse 91, temperature 98.2 °F (36.8 °C), temperature source Oral, resp. rate 16, height 5' 3\" (1.6 m), weight 239 lb 3.2 oz (108.5 kg), last menstrual period 2023, SpO2 96 %. Temp (24hrs), Av °F (36.7 °C), Min:97.2 °F (36.2 °C), Max:98.6 °F (37 °C)        Exam:  Patient without distress. Abdomen soft,  nontender. Incision dry and clean without erythema. Lower extremities are negative for swelling, cords, or tenderness. Lab/Data Review:  None    Assessment and Plan:  POD #0 s/p MAAME [place]. Continue routine post-op care. Continue mgmt in ICU, hospitalist admitted, appreciate assistance. If unable to void 6 hours after downs catheter removed (or if PVR on bladder scan >150cc), may need in and out cath.        Paula Farias MD  [facility]

## 2023-06-16 NOTE — PERIOP NOTE
Pt arrives to PACU via stretcher with CRNA holding left arm up. CRNA reports IV in left arm is infiltrated and we need new IV access. Pt crying in pain stating that her left arm hurts and her fingers feel tingly. Pt appears to be a hard stick. Dr. Amina Meade at bedside with [address] to place IV. Amy Mcdonald RN successfully places right forearm IV after 2 unsuccessful attempts by Dr. Amina Meade. Pt becomes panicky and has trouble taking a deep breath. Dr. Dave Dalton notified.

## 2023-06-16 NOTE — CONSULTS
Procedure Laterality Date     DELIVERY ONLY  , ,     CHOLECYSTECTOMY      HEENT Left 2019    excision of L neck branchial cleft cyst- [facility]    TONSILLECTOMY      TUBAL LIGATION  2019        Social History     Tobacco Use    Smoking status: Never    Smokeless tobacco: Never   Substance Use Topics    Alcohol use: Not Currently     Comment: socially      Social History     Substance and Sexual Activity   Drug Use No       Family History   Problem Relation Age of Onset    Lung Cancer Paternal Grandfather     Breast Cancer Paternal Grandmother     Sickle Cell Trait Maternal Grandfather     Sickle Cell Trait Mother     No Known Problems Father     Hypertension Maternal Grandmother         Immunization History   Administered Date(s) Administered    Influenza, FLUARIX, FLULAVAL, FLUZONE (age 10 mo+) AND AFLURIA, (age 1 y+), PF, 0.5mL 2019     Allergies   Allergen Reactions    Amoxicillin-Pot Clavulanate Anaphylaxis    Azithromycin Hives    Shrimp Extract Allergy Skin Test Itching     Betadine on skin is ok      Current Facility-Administered Medications   Medication Dose Route Frequency    HYDROmorphone (DILAUDID) injection 0.5 mg  0.5 mg IntraVENous Q5 Min PRN    oxyCODONE (ROXICODONE) immediate release tablet 5 mg  5 mg Oral Once PRN    prochlorperazine (COMPAZINE) injection 5 mg  5 mg IntraVENous Once PRN    lactated ringers IV soln infusion   IntraVENous Continuous    sodium chloride flush 0.9 % injection 5-40 mL  5-40 mL IntraVENous 2 times per day    sodium chloride flush 0.9 % injection 5-40 mL  5-40 mL IntraVENous PRN    0.9 % sodium chloride infusion   IntraVENous PRN    diphenhydrAMINE (BENADRYL) injection 12.5 mg  12.5 mg IntraVENous Q15 Min PRN       Objective:   Patient Vitals for the past 24 hrs:   Temp Pulse Resp BP SpO2   23 1348 98.6 °F (37 °C) 93 16 (!) 156/70 99 %   23 0843 97.2 °F (36.2 °C) 87 18 133/79 98 %       Oxygen Therapy  SpO2: 99 %  O2 Device:

## 2023-06-16 NOTE — PERIOP NOTE
Dr. Jarvis Don at bedside to speak with patient. MD wants patient to be transferred to ICU so we can keep a close eye on her.

## 2023-06-17 VITALS
HEIGHT: 63 IN | OXYGEN SATURATION: 100 % | SYSTOLIC BLOOD PRESSURE: 121 MMHG | TEMPERATURE: 98.8 F | WEIGHT: 239.2 LBS | BODY MASS INDEX: 42.38 KG/M2 | RESPIRATION RATE: 22 BRPM | HEART RATE: 90 BPM | DIASTOLIC BLOOD PRESSURE: 71 MMHG

## 2023-06-17 PROBLEM — R06.00 DYSPNEA: Status: ACTIVE | Noted: 2023-06-17

## 2023-06-17 LAB
ANION GAP SERPL CALC-SCNC: 5 MMOL/L (ref 2–11)
BASOPHILS # BLD: 0.1 K/UL (ref 0–0.2)
BASOPHILS NFR BLD: 0 % (ref 0–2)
BUN SERPL-MCNC: 8 MG/DL (ref 6–23)
CALCIUM SERPL-MCNC: 8.6 MG/DL (ref 8.3–10.4)
CHLORIDE SERPL-SCNC: 112 MMOL/L (ref 101–110)
CO2 SERPL-SCNC: 26 MMOL/L (ref 21–32)
CREAT SERPL-MCNC: 0.67 MG/DL (ref 0.6–1)
DIFFERENTIAL METHOD BLD: ABNORMAL
EOSINOPHIL # BLD: 0 K/UL (ref 0–0.8)
EOSINOPHIL NFR BLD: 0 % (ref 0.5–7.8)
ERYTHROCYTE [DISTWIDTH] IN BLOOD BY AUTOMATED COUNT: 16.5 % (ref 11.9–14.6)
GLUCOSE SERPL-MCNC: 109 MG/DL (ref 65–100)
HCT VFR BLD AUTO: 33.3 % (ref 35.8–46.3)
HGB BLD-MCNC: 10.4 G/DL (ref 11.7–15.4)
IMM GRANULOCYTES # BLD AUTO: 0.1 K/UL (ref 0–0.5)
IMM GRANULOCYTES NFR BLD AUTO: 1 % (ref 0–5)
LYMPHOCYTES # BLD: 1.7 K/UL (ref 0.5–4.6)
LYMPHOCYTES NFR BLD: 10 % (ref 13–44)
MCH RBC QN AUTO: 24.1 PG (ref 26.1–32.9)
MCHC RBC AUTO-ENTMCNC: 31.2 G/DL (ref 31.4–35)
MCV RBC AUTO: 77.3 FL (ref 82–102)
MONOCYTES # BLD: 1.4 K/UL (ref 0.1–1.3)
MONOCYTES NFR BLD: 8 % (ref 4–12)
NEUTS SEG # BLD: 14.8 K/UL (ref 1.7–8.2)
NEUTS SEG NFR BLD: 82 % (ref 43–78)
NRBC # BLD: 0 K/UL (ref 0–0.2)
PLATELET # BLD AUTO: 327 K/UL (ref 150–450)
PMV BLD AUTO: 9.6 FL (ref 9.4–12.3)
POTASSIUM SERPL-SCNC: 4 MMOL/L (ref 3.5–5.1)
RBC # BLD AUTO: 4.31 M/UL (ref 4.05–5.2)
SODIUM SERPL-SCNC: 143 MMOL/L (ref 133–143)
WBC # BLD AUTO: 18.1 K/UL (ref 4.3–11.1)

## 2023-06-17 PROCEDURE — 6370000000 HC RX 637 (ALT 250 FOR IP): Performed by: HOSPITALIST

## 2023-06-17 PROCEDURE — 80048 BASIC METABOLIC PNL TOTAL CA: CPT

## 2023-06-17 PROCEDURE — 2580000003 HC RX 258: Performed by: STUDENT IN AN ORGANIZED HEALTH CARE EDUCATION/TRAINING PROGRAM

## 2023-06-17 PROCEDURE — 6370000000 HC RX 637 (ALT 250 FOR IP): Performed by: STUDENT IN AN ORGANIZED HEALTH CARE EDUCATION/TRAINING PROGRAM

## 2023-06-17 PROCEDURE — G0378 HOSPITAL OBSERVATION PER HR: HCPCS

## 2023-06-17 PROCEDURE — 36415 COLL VENOUS BLD VENIPUNCTURE: CPT

## 2023-06-17 PROCEDURE — 85025 COMPLETE CBC W/AUTO DIFF WBC: CPT

## 2023-06-17 RX ORDER — LEVOFLOXACIN 500 MG/1
500 TABLET, FILM COATED ORAL DAILY
Qty: 5 TABLET | Refills: 0 | Status: SHIPPED | OUTPATIENT
Start: 2023-06-18 | End: 2023-06-23

## 2023-06-17 RX ORDER — LEVOFLOXACIN 500 MG/1
750 TABLET, FILM COATED ORAL DAILY
Status: DISCONTINUED | OUTPATIENT
Start: 2023-06-17 | End: 2023-06-17 | Stop reason: HOSPADM

## 2023-06-17 RX ORDER — LACTOBACILLUS RHAMNOSUS GG 10B CELL
1 CAPSULE ORAL 2 TIMES DAILY
Qty: 20 CAPSULE | Refills: 0 | Status: SHIPPED | OUTPATIENT
Start: 2023-06-17 | End: 2023-06-27

## 2023-06-17 RX ADMIN — IBUPROFEN 800 MG: 800 TABLET, FILM COATED ORAL at 01:12

## 2023-06-17 RX ADMIN — SODIUM CHLORIDE, PRESERVATIVE FREE 10 ML: 5 INJECTION INTRAVENOUS at 11:18

## 2023-06-17 RX ADMIN — LEVOFLOXACIN 750 MG: 500 TABLET, FILM COATED ORAL at 11:18

## 2023-06-17 RX ADMIN — OXYCODONE HYDROCHLORIDE 5 MG: 5 TABLET ORAL at 06:44

## 2023-06-17 RX ADMIN — IBUPROFEN 800 MG: 800 TABLET, FILM COATED ORAL at 12:50

## 2023-06-17 ASSESSMENT — PAIN SCALES - GENERAL
PAINLEVEL_OUTOF10: 2
PAINLEVEL_OUTOF10: 5
PAINLEVEL_OUTOF10: 9
PAINLEVEL_OUTOF10: 5
PAINLEVEL_OUTOF10: 5

## 2023-06-17 ASSESSMENT — PAIN DESCRIPTION - LOCATION
LOCATION: HAND
LOCATION: HEAD
LOCATION: ABDOMEN
LOCATION: HAND;ARM

## 2023-06-17 ASSESSMENT — PAIN DESCRIPTION - DESCRIPTORS
DESCRIPTORS: SORE;THROBBING
DESCRIPTORS: THROBBING;TIGHTNESS;SORE

## 2023-06-17 ASSESSMENT — PAIN DESCRIPTION - ORIENTATION
ORIENTATION: LEFT
ORIENTATION: LEFT

## 2023-06-17 ASSESSMENT — PAIN DESCRIPTION - FREQUENCY: FREQUENCY: CONTINUOUS

## 2023-06-17 ASSESSMENT — PAIN - FUNCTIONAL ASSESSMENT: PAIN_FUNCTIONAL_ASSESSMENT: ACTIVITIES ARE NOT PREVENTED

## 2023-06-17 ASSESSMENT — PAIN DESCRIPTION - PAIN TYPE: TYPE: SURGICAL PAIN

## 2023-06-17 ASSESSMENT — PAIN DESCRIPTION - ONSET: ONSET: ON-GOING

## 2023-06-17 NOTE — DISCHARGE INSTRUCTIONS
Please make sure that you follow-up with Dr. Ysabel Suarez for postoperative care  Also call your PCP and follow-up there as well. You will be on antibiotics for another 5 days starting tomorrow. You will receive your first dose of antibiotics in the hospital before you leave. You should also take a probiotic twice daily for 10 days. You may otherwise resume your usual home medications. Use OTC Ibuprofen or Tylenol for pain as directed on the bottle. You may use heat to your hand and forearm for comfort. However, if you notice the swelling begins to creep up the arm, please contact your PCP or Dr. Ysabel Suarez or come to the emergency department. Also, if you develop red streaking, difficulty moving your fingers, or fevers greater than 100 deg come to the ED. It will take some time for this to clear, possibly as long as a week. Make sure that you continue to have feeling of your fingers and that if you pinch them the blood comes back quickly.   Thank you very much for allowing us to take care of you and we wish you all the best.

## 2023-06-17 NOTE — PROGRESS NOTES
Gynecology Progress Note    Patient doing well post-op day 1 from MAAME [place], complicated by IV infiltration with significant paralytic administration. Pt admitted to ICU overnight for close monitoring for respiratory distress. She is doing well without significant complaints. Pain controlled on current medication. Pt denies abdominal/pelvic pain but reports pain in left arm and inability to make a fist due to swelling. Pt is ambulating and voiding without difficulty. Vitals:  Blood pressure 123/79, pulse 85, temperature 98.5 °F (36.9 °C), temperature source Oral, resp. rate 17, height 5' 3\" (1.6 m), weight 239 lb 3.2 oz (108.5 kg), last menstrual period 2023, SpO2 98 %. Temp (24hrs), Av.3 °F (36.8 °C), Min:97.2 °F (36.2 °C), Max:98.8 °F (37.1 °C)        Exam:  Patient without distress. Abdomen soft,  nontender. Incision dry and clean without erythema. Lower extremities are negative for swelling, cords, or tenderness. Good capillary refill in fingers of left hand    Lab/Data Review:  None    Assessment and Plan:  POD #1 s/p MAAME [place]. Continue routine post-op care. Continue mgmt in ICU, hospitalist admitted, appreciate assistance. OK to discharge home from a surgical stand-point.       Margo Peters MD  [facility]

## 2023-06-17 NOTE — PLAN OF CARE
Problem: Pain  Goal: Verbalizes/displays adequate comfort level or baseline comfort level  Outcome: Completed  Flowsheets (Taken 6/17/2023 4027)  Verbalizes/displays adequate comfort level or baseline comfort level:   Encourage patient to monitor pain and request assistance   Assess pain using appropriate pain scale   Administer analgesics based on type and severity of pain and evaluate response     Problem: Discharge Planning  Goal: Discharge to home or other facility with appropriate resources  Outcome: Completed  Flowsheets (Taken 6/17/2023 7377)  Discharge to home or other facility with appropriate resources:   Identify barriers to discharge with patient and caregiver   Arrange for needed discharge resources and transportation as appropriate   Refer to discharge planning if patient needs post-hospital services based on physician order or complex needs related to functional status, cognitive ability or social support system     Problem: ABCDS Injury Assessment  Goal: Absence of physical injury  Outcome: Completed

## 2023-06-17 NOTE — DISCHARGE SUMMARY
0.00   LYMPHOPCT  --  10*   EOSRELPCT  --  0*   MONOPCT  --  8   BASOPCT  --  0   IMMGRAN  --  1   LYMPHSABS  --  1.7   EOSABS  --  0.0   MONOSABS  --  1.4*   BASOSABS  --  0.1   ABSIMMGRAN  --  0.1      LFT No results for input(s): BILITOT, BILIDIR, ALKPHOS, AST, ALT, PROT, LABALBU, GLOB in the last 72 hours. Cardiac  No results found for: NTPROBNP, TROPHS   Coags No results found for: PROTIME, INR, APTT   A1c No results found for: LABA1C, EAG   Lipids No results found for: CHOL, LDLCALC, LABVLDL, HDL, CHOLHDLRATIO, TRIG   Thyroid  No results found for: Jenn Silver     Most Recent UA No results found for: COLORU, APPEARANCE, SPECGRAV, LABPH, PROTEINU, GLUCOSEU, KETUA, BILIRUBINUR, BLOODU, UROBILINOGEN, NITRU, LEUKOCYTESUR, WBCUA, RBCUA, EPITHUA, BACTERIA, LABCAST, MUCUS     No results for input(s): CULTURE in the last 720 hours. All Labs from Last 24 Hrs:  No results found for this or any previous visit (from the past 24 hour(s)). Allergies   Allergen Reactions    Amoxicillin-Pot Clavulanate Anaphylaxis    Azithromycin Hives    Shrimp Extract Allergy Skin Test Itching     Betadine on skin is ok     Immunization History   Administered Date(s) Administered    Influenza, FLUARIX, FLULAVAL, FLUZONE (age 10 mo+) AND AFLURIA, (age 1 y+), PF, 0.5mL 01/14/2019       Recent Vital Data:  Patient Vitals for the past 24 hrs:   Temp Pulse Resp BP SpO2   06/17/23 1401 -- -- -- 121/71 100 %   06/17/23 1301 -- 90 22 111/84 97 %   06/17/23 1201 -- 91 18 124/67 100 %   06/17/23 1101 -- 90 21 121/67 97 %   06/17/23 1100 98.8 °F (37.1 °C) 82 18 121/67 97 %   06/17/23 1001 -- 92 18 128/69 100 %       Oxygen Therapy  SpO2: 100 %  Pulse via Oximetry: 91 beats per minute  O2 Device: None (Room air)  O2 Flow Rate (L/min): 3 L/min    Estimated body mass index is 42.37 kg/m² as calculated from the following:    Height as of this encounter: 5' 3\" (1.6 m). Weight as of this encounter: 239 lb 3.2 oz (108.5 kg).     Intake/Output

## 2023-06-17 NOTE — CARE COORDINATION
06/17/23 0825   Service Assessment   Patient Orientation Alert and Oriented   Cognition Alert   History Provided By Patient   Primary Caregiver Self   Support Systems Spouse/Significant Other   PCP Verified by CM Yes   Last Visit to PCP Within last 3 months   Prior Functional Level Independent in ADLs/IADLs   Current Functional Level Independent in ADLs/IADLs   Can patient return to prior living arrangement Yes   Ability to make needs known: Good   Family able to assist with home care needs: Yes   Would you like for me to discuss the discharge plan with any other family members/significant others, and if so, who? Yes   Financial Resources Other (Comment)  ([employer])   Community Resources None   Social/Functional History   Lives With Spouse   Type of [address] None   ADL Assistance Independent   Mode of Transportation Car   Discharge Planning   Type of Residence House   Living Arrangements Spouse/Significant Other   Current Services Prior To Admission None   Potential Assistance Needed N/A   DME Ordered? No   Potential Assistance Purchasing Medications No   Type of Home Care Services None   [facility]   Transition of Care Consult (CM Consult) N/A   Services At/After Discharge None     RN LAURA met with the patient and spouse at the bedside and discussed discharge planning. She lives in a private residence and plans on returning home at discharge. Her spouse will transport her home. She is independent of ADLs and does not use DMEs or external services. RN LAURA encouraged her to ask questions. No case management needs were identified at this time.

## 2023-06-23 ENCOUNTER — TELEPHONE (OUTPATIENT)
Dept: OBGYN CLINIC | Age: 28
End: 2023-06-23

## 2023-06-23 NOTE — TELEPHONE ENCOUNTER
Called pt to follow up after sx per . Per pt she had to be seen yesterday for vascular scan d/t arm still being swollen and painful. Pt reports they recommended PT which will start 7/1/23. Will notify . Encouraged pt to call back if she needs anything at all. Pt appreciated call.

## 2023-06-30 ENCOUNTER — OFFICE VISIT (OUTPATIENT)
Dept: OBGYN CLINIC | Age: 28
End: 2023-06-30

## 2023-06-30 VITALS
SYSTOLIC BLOOD PRESSURE: 128 MMHG | BODY MASS INDEX: 39.87 KG/M2 | DIASTOLIC BLOOD PRESSURE: 84 MMHG | HEIGHT: 63 IN | WEIGHT: 225 LBS

## 2023-06-30 DIAGNOSIS — Z48.89 ENCOUNTER FOR POSTOPERATIVE CARE: Primary | ICD-10-CM

## 2023-06-30 PROCEDURE — 99024 POSTOP FOLLOW-UP VISIT: CPT | Performed by: OBSTETRICS & GYNECOLOGY

## 2023-07-11 ENCOUNTER — OFFICE VISIT (OUTPATIENT)
Dept: OBGYN CLINIC | Age: 28
End: 2023-07-11

## 2023-07-11 VITALS
DIASTOLIC BLOOD PRESSURE: 72 MMHG | SYSTOLIC BLOOD PRESSURE: 128 MMHG | HEIGHT: 63 IN | WEIGHT: 225.2 LBS | BODY MASS INDEX: 39.9 KG/M2

## 2023-07-11 DIAGNOSIS — Z48.89 ENCOUNTER FOR POSTOPERATIVE CARE: Primary | ICD-10-CM

## 2023-07-11 PROCEDURE — 99024 POSTOP FOLLOW-UP VISIT: CPT | Performed by: OBSTETRICS & GYNECOLOGY

## 2023-07-28 NOTE — PROGRESS NOTES
Patient presents today for a post-op visit. She is 6 weeks from Hysterectomy Abdominal Laparoscopic Bilateral Salpingectomy Robotic by Dr. Cornelio Brown on 6/16/23. She is tolerating a regular diet, passing gas and her pain is under good control. She denies signs of bleeding and infection. She is in good spirits. She is still having therapy for her hand. Now with adhesive capsulitis from not moving her left hand. Still with chest pain -neg xray x 2. Will get a chest ct. LMP 05/01/2023     General appearance: no distress  Respiratory: clear to auscultation bilaterally  Cardiovascular: regular heart rate, no murmurs  Abdomen: soft, non-tender, bowel sounds present. Port sites clean /dry and intact. Extremities: no calf pain or tenderness. No redness. Pelvic exam: normal external genitalia, vulva, vagina. Cuff visually intact and on bimanual exam felt intact. Neurologic: alert and oriented, cranial nerves grossly intact    Plan: Patient is discharged from further care for this condition; patient will return to clinic in about a month to follow up with dr Cornelio Brown. Bleeding precautions given. May exercise. Resume sex in next 3-5 days.

## 2023-07-31 ENCOUNTER — OFFICE VISIT (OUTPATIENT)
Dept: OBGYN CLINIC | Age: 28
End: 2023-07-31

## 2023-07-31 VITALS — WEIGHT: 227.1 LBS | SYSTOLIC BLOOD PRESSURE: 124 MMHG | BODY MASS INDEX: 40.23 KG/M2 | DIASTOLIC BLOOD PRESSURE: 76 MMHG

## 2023-07-31 DIAGNOSIS — Z48.89 ENCOUNTER FOR POSTOPERATIVE CARE: Primary | ICD-10-CM

## 2023-07-31 PROCEDURE — 99024 POSTOP FOLLOW-UP VISIT: CPT | Performed by: OBSTETRICS & GYNECOLOGY

## 2023-07-31 RX ORDER — GABAPENTIN 100 MG/1
100 CAPSULE ORAL 3 TIMES DAILY
COMMUNITY

## 2023-08-24 NOTE — PROGRESS NOTES
Humble Villareal presents for postop visit from Hysterectomy Abdominal Laparoscopic Bilateral Salpingectomy Robotic on 6/16/23 about 10 weeks ago. Doing well postoperatively. without any bleeding. Fever: No Voiding well: Yes. Bowel movements OK: Yes. Pt reports she is still in PT twice a week - thinking next week will move down to once a week. Pt is not back to work yet b/c PCP has not cleared her d/t arm/chest. Pt report she is still having \"dead feeling\" in arm but is overall feeling that she has improved. She takes gabapentin for pins and needles sensation. She is now driving. Exam: A&OX3, NAD. Abdomen:  Non tender Incisions clean, dry, intact    Discussed pathology report which was benign. A/P. Stable Post op condition. Gradually increase activity. Resumption of sexual activity is  encouraged at this time. Follow up PRN.       Mitchell Hwang MD  [facility]

## 2023-08-28 ENCOUNTER — OFFICE VISIT (OUTPATIENT)
Dept: OBGYN CLINIC | Age: 28
End: 2023-08-28

## 2023-08-28 VITALS
WEIGHT: 227.6 LBS | DIASTOLIC BLOOD PRESSURE: 68 MMHG | SYSTOLIC BLOOD PRESSURE: 120 MMHG | HEIGHT: 63 IN | BODY MASS INDEX: 40.33 KG/M2

## 2023-08-28 DIAGNOSIS — Z98.890 POST-OPERATIVE STATE: Primary | ICD-10-CM

## 2023-08-28 PROCEDURE — 99024 POSTOP FOLLOW-UP VISIT: CPT | Performed by: STUDENT IN AN ORGANIZED HEALTH CARE EDUCATION/TRAINING PROGRAM

## (undated) DEVICE — HEAD AND NECK: Brand: MEDLINE INDUSTRIES, INC.

## (undated) DEVICE — KENDALL SCD EXPRESS SLEEVES, KNEE LENGTH, MEDIUM: Brand: KENDALL SCD

## (undated) DEVICE — SLIM BODY SKIN STAPLER: Brand: APPOSE ULC

## (undated) DEVICE — SOLUTION IV 1000ML 0.9% SOD CHL

## (undated) DEVICE — AMD ANTIMICROBIAL NON-ADHERENT PAD,0.2% POLYHEXAMETHYLENE BIGUANIDE HCI (PHMB): Brand: TELFA

## (undated) DEVICE — MASTISOL ADHESIVE LIQ 2/3ML

## (undated) DEVICE — SUTURE PERMAHAND SZ 2-0 L18IN NONABSORBABLE BLK L26MM PS 1588H

## (undated) DEVICE — GLOVE SURG SZ 65 THK91MIL LTX FREE SYN POLYISOPRENE

## (undated) DEVICE — APPLICATOR MEDICATED 26 CC SOLUTION HI LT ORNG CHLORAPREP

## (undated) DEVICE — CONTAINER,SPECIMEN,O.R.STRL,4.5OZ: Brand: MEDLINE

## (undated) DEVICE — ROBOTIC HYSTERECTOMY: Brand: MEDLINE INDUSTRIES, INC.

## (undated) DEVICE — SOLUTION IV 1000ML LAC RINGERS PH 6.5 INJ USP VIAFLX PLAS

## (undated) DEVICE — COLUMN DRAPE

## (undated) DEVICE — TIP COVER ACCESSORY

## (undated) DEVICE — REM POLYHESIVE ADULT PATIENT RETURN ELECTRODE: Brand: VALLEYLAB

## (undated) DEVICE — HOOK RETRCT DIA5MM SHRP E STAY DISP FOR LONE STAR SELF RET

## (undated) DEVICE — Device

## (undated) DEVICE — CARDINAL HEALTH FLEXIBLE LIGHT HANDLE COVER: Brand: CARDINAL HEALTH

## (undated) DEVICE — GLOVE SURG SZ 65 L12IN FNGR THK79MIL GRN LTX FREE

## (undated) DEVICE — DRAPE TWL SURG 16X26IN BLU ORB04] ALLCARE INC]

## (undated) DEVICE — DRAIN SURG L49IN DIA1/8IN 10IN H SIL W/O TRCR END PERF

## (undated) DEVICE — CANNULA SEAL

## (undated) DEVICE — CANISTER, RIGID, 2000CC: Brand: MEDLINE INDUSTRIES, INC.

## (undated) DEVICE — PORT HND ACC L120MM 12MM BLDELSS OPT TIP AIRSEAL

## (undated) DEVICE — INSULATED BLADE ELECTRODE: Brand: EDGE

## (undated) DEVICE — BUTTON SWITCH PENCIL BLADE ELECTRODE, HOLSTER: Brand: EDGE

## (undated) DEVICE — SPONGE: SPECIALTY PEANUT XR 100/CS: Brand: MEDICAL ACTION INDUSTRIES

## (undated) DEVICE — SOLUTION IRRIG 1000ML STRL H2O USP PLAS POUR BTL

## (undated) DEVICE — ROBOTIC DRAPE WITH LEGGINGS: Brand: CONVERTORS

## (undated) DEVICE — LOGICUT SCISSOR LENGTH 320MM: Brand: LOGI - LAPAROSCOPIC INSTRUMENT SYSTEM

## (undated) DEVICE — PAD PT POS 36 IN SURGYPAD DISP

## (undated) DEVICE — BLADELESS OBTURATOR: Brand: WECK VISTA

## (undated) DEVICE — 2000CC GUARDIAN II: Brand: GUARDIAN

## (undated) DEVICE — TRAY PREP DRY W/ PREM GLV 2 APPL 6 SPNG 2 UNDPD 1 OVERWRAP

## (undated) DEVICE — MAGNETIC DRAPE: Brand: DEVON

## (undated) DEVICE — TIP IU L8CM DIA6.7MM BLU SIL FLX DISP RUMI II

## (undated) DEVICE — STRIP,CLOSURE,WOUND,MEDI-STRIP,1/2X4: Brand: MEDLINE

## (undated) DEVICE — AMD ANTIMICROBIAL SUPER SPONGES,MEDIUM: Brand: KERLIX

## (undated) DEVICE — SUTURE VCRL SZ 3-0 L27IN ABSRB UD L26MM SH 1/2 CIR J416H

## (undated) DEVICE — STIMULATOR 8562010 VARI-STIM 10PK ROHS: Brand: VARI-STIM®

## (undated) DEVICE — SYRINGE MED 10ML LUERLOCK TIP W/O SFTY DISP

## (undated) DEVICE — BARRIER ADH W3XL4IN UTER PELV ABSRB GYNECARE INTCEED

## (undated) DEVICE — SUTURE ABSORBABLE MONOFILAMENT 0 CT-1 36 MM DYED SPRL PDS + SXPP1B450

## (undated) DEVICE — SUTURE PLN GUT SZ 5-0 L18IN ABSRB YELLOWISH TAN L13MM PC-1 1915G

## (undated) DEVICE — SUTURE PERMAHAND SZ 3-0 L18IN NONABSORBABLE BLK SILK BRAID A184H

## (undated) DEVICE — ARM DRAPE

## (undated) DEVICE — TOTAL TRAY, DB, 100% SILI FOLEY, 16FR 10: Brand: MEDLINE

## (undated) DEVICE — STRETCH BANDAGE ROLL: Brand: DERMACEA

## (undated) DEVICE — SUTURE MCRYL SZ 4-0 L27IN ABSRB UD L19MM PS-2 1/2 CIR PRIM Y426H

## (undated) DEVICE — (D)SUT PLN FST 6-0 18IN PC1 -- DISC BY MFR

## (undated) DEVICE — (D)STRIP SKN CLSR 0.5X4IN WHT --